# Patient Record
Sex: MALE | Race: WHITE | NOT HISPANIC OR LATINO | Employment: STUDENT | URBAN - METROPOLITAN AREA
[De-identification: names, ages, dates, MRNs, and addresses within clinical notes are randomized per-mention and may not be internally consistent; named-entity substitution may affect disease eponyms.]

---
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
## 2021-06-07 ENCOUNTER — OFFICE VISIT (OUTPATIENT)
Dept: PSYCHOLOGY | Facility: CLINIC | Age: 21
End: 2021-06-07
Payer: COMMERCIAL

## 2021-06-07 DIAGNOSIS — F41.1 GAD (GENERALIZED ANXIETY DISORDER): ICD-10-CM

## 2021-06-07 DIAGNOSIS — F90.0 ATTENTION DEFICIT HYPERACTIVITY DISORDER (ADHD), PREDOMINANTLY INATTENTIVE TYPE: Primary | ICD-10-CM

## 2021-06-07 DIAGNOSIS — F32.A DEPRESSION, UNSPECIFIED DEPRESSION TYPE: ICD-10-CM

## 2021-06-07 PROCEDURE — 90791 PSYCH DIAGNOSTIC EVALUATION: CPT | Mod: S$GLB,,, | Performed by: SOCIAL WORKER

## 2021-06-07 PROCEDURE — 90791 PR PSYCHIATRIC DIAGNOSTIC EVALUATION: ICD-10-PCS | Mod: S$GLB,,, | Performed by: SOCIAL WORKER

## 2021-06-07 PROCEDURE — 90785 PR INTERACTIVE COMPLEXITY: ICD-10-PCS | Mod: S$GLB,,, | Performed by: SOCIAL WORKER

## 2021-06-07 PROCEDURE — 90785 PSYTX COMPLEX INTERACTIVE: CPT | Mod: S$GLB,,, | Performed by: SOCIAL WORKER

## 2021-06-22 ENCOUNTER — PATIENT MESSAGE (OUTPATIENT)
Dept: PRIMARY CARE CLINIC | Facility: CLINIC | Age: 21
End: 2021-06-22

## 2021-06-23 ENCOUNTER — OFFICE VISIT (OUTPATIENT)
Dept: PRIMARY CARE CLINIC | Facility: CLINIC | Age: 21
End: 2021-06-23
Payer: COMMERCIAL

## 2021-06-23 VITALS
RESPIRATION RATE: 18 BRPM | HEIGHT: 75 IN | HEART RATE: 77 BPM | DIASTOLIC BLOOD PRESSURE: 86 MMHG | WEIGHT: 173.63 LBS | TEMPERATURE: 98 F | BODY MASS INDEX: 21.59 KG/M2 | OXYGEN SATURATION: 98 % | SYSTOLIC BLOOD PRESSURE: 120 MMHG

## 2021-06-23 DIAGNOSIS — Z11.4 ENCOUNTER FOR SCREENING FOR HIV: ICD-10-CM

## 2021-06-23 DIAGNOSIS — K62.5 BRIGHT RED BLOOD PER RECTUM: ICD-10-CM

## 2021-06-23 DIAGNOSIS — Z87.442 HISTORY OF NEPHROLITHIASIS: ICD-10-CM

## 2021-06-23 DIAGNOSIS — Z11.59 NEED FOR HEPATITIS C SCREENING TEST: ICD-10-CM

## 2021-06-23 DIAGNOSIS — Z87.898 HISTORY OF VOMITING: Primary | ICD-10-CM

## 2021-06-23 PROCEDURE — 99214 PR OFFICE/OUTPT VISIT, EST, LEVL IV, 30-39 MIN: ICD-10-PCS | Mod: S$GLB,,, | Performed by: FAMILY MEDICINE

## 2021-06-23 PROCEDURE — 99999 PR PBB SHADOW E&M-EST. PATIENT-LVL III: CPT | Mod: PBBFAC,,, | Performed by: FAMILY MEDICINE

## 2021-06-23 PROCEDURE — 99999 PR PBB SHADOW E&M-EST. PATIENT-LVL III: ICD-10-PCS | Mod: PBBFAC,,, | Performed by: FAMILY MEDICINE

## 2021-06-23 PROCEDURE — 99214 OFFICE O/P EST MOD 30 MIN: CPT | Mod: S$GLB,,, | Performed by: FAMILY MEDICINE

## 2021-06-23 RX ORDER — DEXTROAMPHETAMINE SACCHARATE, AMPHETAMINE ASPARTATE MONOHYDRATE, DEXTROAMPHETAMINE SULFATE AND AMPHETAMINE SULFATE 7.5; 7.5; 7.5; 7.5 MG/1; MG/1; MG/1; MG/1
30 CAPSULE, EXTENDED RELEASE ORAL EVERY MORNING
COMMUNITY
End: 2024-01-05 | Stop reason: ALTCHOICE

## 2021-12-03 ENCOUNTER — IMMUNIZATION (OUTPATIENT)
Dept: PRIMARY CARE CLINIC | Facility: CLINIC | Age: 21
End: 2021-12-03
Payer: COMMERCIAL

## 2021-12-03 DIAGNOSIS — Z23 NEED FOR VACCINATION: Primary | ICD-10-CM

## 2021-12-03 PROCEDURE — 0001A COVID-19, MRNA, LNP-S, PF, 30 MCG/0.3 ML DOSE VACCINE: CPT | Mod: PBBFAC | Performed by: EMERGENCY MEDICINE

## 2021-12-29 ENCOUNTER — IMMUNIZATION (OUTPATIENT)
Dept: INTERNAL MEDICINE | Facility: CLINIC | Age: 21
End: 2021-12-29
Payer: COMMERCIAL

## 2021-12-29 DIAGNOSIS — Z23 NEED FOR VACCINATION: Primary | ICD-10-CM

## 2021-12-29 PROCEDURE — 91300 COVID-19, MRNA, LNP-S, PF, 30 MCG/0.3 ML DOSE VACCINE: CPT | Mod: PBBFAC | Performed by: INTERNAL MEDICINE

## 2021-12-29 PROCEDURE — 0002A COVID-19, MRNA, LNP-S, PF, 30 MCG/0.3 ML DOSE VACCINE: CPT | Mod: PBBFAC | Performed by: INTERNAL MEDICINE

## 2023-09-20 ENCOUNTER — PATIENT MESSAGE (OUTPATIENT)
Dept: PRIMARY CARE CLINIC | Facility: CLINIC | Age: 23
End: 2023-09-20
Payer: COMMERCIAL

## 2023-09-20 DIAGNOSIS — F90.0 ATTENTION DEFICIT HYPERACTIVITY DISORDER (ADHD), PREDOMINANTLY INATTENTIVE TYPE: Primary | ICD-10-CM

## 2023-10-04 NOTE — TELEPHONE ENCOUNTER
Called pt regarding message. Informed pt of Psychiatry phone number. Pt stated he will call to schedule appt.

## 2023-10-18 ENCOUNTER — PATIENT MESSAGE (OUTPATIENT)
Dept: CARDIOLOGY | Facility: CLINIC | Age: 23
End: 2023-10-18
Payer: COMMERCIAL

## 2023-12-28 ENCOUNTER — CLINICAL SUPPORT (OUTPATIENT)
Dept: PSYCHIATRY | Facility: CLINIC | Age: 23
End: 2023-12-28
Payer: COMMERCIAL

## 2023-12-28 ENCOUNTER — PATIENT MESSAGE (OUTPATIENT)
Dept: PSYCHIATRY | Facility: CLINIC | Age: 23
End: 2023-12-28
Payer: COMMERCIAL

## 2023-12-28 ENCOUNTER — OFFICE VISIT (OUTPATIENT)
Dept: PSYCHIATRY | Facility: CLINIC | Age: 23
End: 2023-12-28
Payer: COMMERCIAL

## 2023-12-28 VITALS
WEIGHT: 203.13 LBS | HEIGHT: 75 IN | BODY MASS INDEX: 25.26 KG/M2 | DIASTOLIC BLOOD PRESSURE: 56 MMHG | HEART RATE: 67 BPM | SYSTOLIC BLOOD PRESSURE: 113 MMHG

## 2023-12-28 DIAGNOSIS — F90.0 ATTENTION DEFICIT HYPERACTIVITY DISORDER (ADHD), PREDOMINANTLY INATTENTIVE TYPE: Primary | ICD-10-CM

## 2023-12-28 DIAGNOSIS — F41.1 GAD (GENERALIZED ANXIETY DISORDER): Primary | ICD-10-CM

## 2023-12-28 DIAGNOSIS — F31.81 BIPOLAR 2 DISORDER: ICD-10-CM

## 2023-12-28 DIAGNOSIS — F90.0 ATTENTION DEFICIT HYPERACTIVITY DISORDER (ADHD), PREDOMINANTLY INATTENTIVE TYPE: ICD-10-CM

## 2023-12-28 LAB
AMPHET+METHAMPHET UR QL: NEGATIVE
BARBITURATES UR QL SCN>200 NG/ML: NEGATIVE
BENZODIAZ UR QL SCN>200 NG/ML: NEGATIVE
BZE UR QL SCN: NEGATIVE
CANNABINOIDS UR QL SCN: NEGATIVE
CREAT UR-MCNC: 418 MG/DL (ref 23–375)
ETHANOL UR-MCNC: <10 MG/DL
METHADONE UR QL SCN>300 NG/ML: NEGATIVE
OPIATES UR QL SCN: NEGATIVE
PCP UR QL SCN>25 NG/ML: NEGATIVE
TOXICOLOGY INFORMATION: ABNORMAL

## 2023-12-28 PROCEDURE — 3008F BODY MASS INDEX DOCD: CPT | Mod: CPTII,S$GLB,, | Performed by: NURSE PRACTITIONER

## 2023-12-28 PROCEDURE — 1160F RVW MEDS BY RX/DR IN RCRD: CPT | Mod: CPTII,S$GLB,, | Performed by: NURSE PRACTITIONER

## 2023-12-28 PROCEDURE — 3074F SYST BP LT 130 MM HG: CPT | Mod: CPTII,S$GLB,, | Performed by: NURSE PRACTITIONER

## 2023-12-28 PROCEDURE — 1159F MED LIST DOCD IN RCRD: CPT | Mod: CPTII,S$GLB,, | Performed by: NURSE PRACTITIONER

## 2023-12-28 PROCEDURE — 80307 DRUG TEST PRSMV CHEM ANLYZR: CPT | Performed by: NURSE PRACTITIONER

## 2023-12-28 PROCEDURE — 3078F DIAST BP <80 MM HG: CPT | Mod: CPTII,S$GLB,, | Performed by: NURSE PRACTITIONER

## 2023-12-28 PROCEDURE — 99999 PR PBB SHADOW E&M-EST. PATIENT-LVL III: CPT | Mod: PBBFAC,,, | Performed by: NURSE PRACTITIONER

## 2023-12-28 PROCEDURE — 99205 OFFICE O/P NEW HI 60 MIN: CPT | Mod: S$GLB,,, | Performed by: NURSE PRACTITIONER

## 2023-12-28 RX ORDER — CARIPRAZINE 1.5 MG/1
1.5 CAPSULE, GELATIN COATED ORAL DAILY
Qty: 30 CAPSULE | Refills: 1 | Status: SHIPPED | OUTPATIENT
Start: 2023-12-28 | End: 2024-02-08 | Stop reason: ALTCHOICE
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
## 2024-01-02 ENCOUNTER — TELEPHONE (OUTPATIENT)
Dept: PSYCHOLOGY | Facility: CLINIC | Age: 24
End: 2024-01-02
Payer: COMMERCIAL

## 2024-02-08 ENCOUNTER — OFFICE VISIT (OUTPATIENT)
Dept: PSYCHIATRY | Facility: CLINIC | Age: 24
End: 2024-02-08
Payer: COMMERCIAL

## 2024-02-08 VITALS
DIASTOLIC BLOOD PRESSURE: 84 MMHG | SYSTOLIC BLOOD PRESSURE: 124 MMHG | WEIGHT: 197.44 LBS | BODY MASS INDEX: 24.68 KG/M2 | HEART RATE: 59 BPM

## 2024-02-08 DIAGNOSIS — F90.0 ATTENTION DEFICIT HYPERACTIVITY DISORDER (ADHD), PREDOMINANTLY INATTENTIVE TYPE: Primary | ICD-10-CM

## 2024-02-08 DIAGNOSIS — F41.1 GAD (GENERALIZED ANXIETY DISORDER): ICD-10-CM

## 2024-02-08 DIAGNOSIS — F31.81 BIPOLAR 2 DISORDER: ICD-10-CM

## 2024-02-08 DIAGNOSIS — G47.00 INSOMNIA, UNSPECIFIED TYPE: ICD-10-CM

## 2024-02-08 PROCEDURE — 3008F BODY MASS INDEX DOCD: CPT | Mod: CPTII,S$GLB,, | Performed by: NURSE PRACTITIONER

## 2024-02-08 PROCEDURE — 99999 PR PBB SHADOW E&M-EST. PATIENT-LVL II: CPT | Mod: PBBFAC,,, | Performed by: NURSE PRACTITIONER

## 2024-02-08 PROCEDURE — 3079F DIAST BP 80-89 MM HG: CPT | Mod: CPTII,S$GLB,, | Performed by: NURSE PRACTITIONER

## 2024-02-08 PROCEDURE — 3074F SYST BP LT 130 MM HG: CPT | Mod: CPTII,S$GLB,, | Performed by: NURSE PRACTITIONER

## 2024-02-08 PROCEDURE — 99215 OFFICE O/P EST HI 40 MIN: CPT | Mod: S$GLB,,, | Performed by: NURSE PRACTITIONER

## 2024-02-08 RX ORDER — HYDROXYZINE PAMOATE 25 MG/1
25 CAPSULE ORAL NIGHTLY PRN
Qty: 60 CAPSULE | Refills: 1 | Status: SHIPPED | OUTPATIENT
Start: 2024-02-08 | End: 2024-03-18 | Stop reason: SDUPTHER

## 2024-02-08 RX ORDER — ARIPIPRAZOLE 5 MG/1
5 TABLET ORAL DAILY
Qty: 30 TABLET | Refills: 1 | Status: SHIPPED | OUTPATIENT
Start: 2024-02-08 | End: 2024-03-18 | Stop reason: SDUPTHER

## 2024-02-08 NOTE — PROGRESS NOTES
Outpatient Psychiatry Follow-Up Visit ([facility])    02/08/2024    Clinical Status of Patient:  Outpatient (Ambulatory)    Chief Complaint:  Giorgi Whitley is a 23 y.o. adult who presents today for follow-up of anxiety and Bipolar II, ADHD .  Met with patient.     Current Medications:   Vraylar 1.5 mg daily    Past Medication Trials:  Adderall 30 mg Xr - too strong  Amphetamine BID- unknown    Interval History and Content of Current Session:  Patient seen and chart reviewed. Last seen on 12/28/23    Patient was prescribed Vraylar at the previous visit for mood stabilization. He presents as frustrated because he has not been able to start this medication due to his insurance company not approving it and  not informing this provider. Reports he's been able to be fairly consistent with tracking his max until about 2-3 weeks ago. Reports continued mood cycling during this time, has had more lows than highs, and days where he does not want to get out of bed. He reports having anxiety about anticipating situations where he has to talk to to her people. Reports these thoughts can get him very worked up and anxious. He continues to day dream about fantasy things or games, he does this in many settings, work, home on the couch, while playing on his phone etc. Reports difficulty with focusing and paying attention, Yesterday he accidentally wore 2 left shoes to work. He often leaves for work and realizes he does not have his keys to start his car.     Denies SI/HI/AVH. Denies adverse effects from medication  Pt reports taking medications as prescribed and behaving appropriately during interview today.    Pt appears:  Appropriate attire    Mood:  Flat    Sleep:  Reports sleeping 4-6 hrs, takes hours to fall asleep.     Appetite:  Slightly diminished    Self Rates Depression: 4-5/10  Self Rated Anxiety:  8-9/10    AIMS:    0    Psychotherapy:  Target symptoms: depression, distractability, lack of focus, anxiety , mood  swings, mood disorder  Why chosen therapy is appropriate versus another modality: relevant to diagnosis  Outcome monitoring methods: self-report  Therapeutic intervention type: supportive psychotherapy  Topics discussed/themes: relationships difficulties, work stress, symptom recognition  The patient's response to the intervention is accepting, motivated. The patient's progress toward treatment goals is not progressing.   Duration of intervention: 20 minutes.    Review of Systems   PSYCHIATRIC: Pertinant items are noted in the narrative.        Past Medical, Family and Social History: The patient's past medical, family and social history have been reviewed and updated as appropriate within the electronic medical record - see encounter notes.    Compliance: unable to be    Side effects: None    Risk Parameters:  Patient reports no suicidal ideation  Patient reports no homicidal ideation  Patient reports no self-injurious behavior  Patient reports no violent behavior    Exam (detailed: at least 9 elements; comprehensive: all 15 elements)   Constitutional  Vitals:  Most recent vital signs, dated less than 90 days prior to this appointment, were reviewed.   Vitals:    02/08/24 1043   BP: 124/84   Pulse: (!) 59   Weight: 89.5 kg (197 lb 6.8 oz)        General:  unremarkable, age appropriate     Musculoskeletal  Muscle Strength/Tone:  no spasicity, no rigidity, no cogwheeling, no flaccidity, no paratonia, no dyskinesia, no dystonia, no tremor, no tic, no choreoathetosis, no atrophy   Gait & Station:  non-ataxic     Psychiatric  Speech:  no latency; no press   Mood & Affect:  steady  congruent and appropriate   Thought Process:  normal and logical   Associations:  intact   Thought Content:  normal, no suicidality, no homicidality, delusions, or paranoia   Insight:  intact, has awareness of illness   Judgement: behavior is adequate to circumstances, age appropriate   Orientation:  grossly intact, person, place, situation,  time/date, day of week   Memory: intact for content of interview, grossly intact, memory >3 objects at five mins   Language: grossly intact, able to name, able to repeat   Attention Span & Concentration:  able to focus   Fund of Knowledge:  intact and appropriate to age and level of education, familiar with aspects of current personal life, 4 of 4 recent presidents     Assessment and Diagnosis   Status/Progress: Based on the examination today, the patient's problem(s) is/are  not changed .  New problems have not been presented today.   Co-morbidities, Diagnostic uncertainty, and Lack of compliance are not complicating management of the primary condition.  There are no active rule-out diagnoses for this patient at this time.     General Impression: Giorgi Whitley, a 23 y.o. adult, presenting for follow up of ADHD, JOJO and Bipolar II disorder.  Presents 12/28/23- Patient has been unmediated. Start Vraylar 1.5 mg Daily   Presents 2/8/24- D/c Vraylar due to insurance, Stat Abilify 5 mg Daily      ICD-10-CM ICD-9-CM    1. Attention deficit hyperactivity disorder (ADHD), predominantly inattentive type  F90.0 314.00 ARIPiprazole (ABILIFY) 5 MG Tab      2. Bipolar 2 disorder  F31.81 296.89 ARIPiprazole (ABILIFY) 5 MG Tab      3. JOJO (generalized anxiety disorder)  F41.1 300.02       4. Insomnia, unspecified type  G47.00 780.52 hydrOXYzine pamoate (VISTARIL) 25 MG Cap          Intervention/Counseling/Treatment Plan   Medication Management: The risks and benefits of medication were discussed with the patient.  D/c Vraylar due to insurance  Stat Abilify 5 mg Daily  Discussed diagnosis, risk and benefits of proposed treatment above vs alternative treatment vs no treatment, and potential side effects of these treatments, and the inherent unpredictability of individual responses to these treatments. The patient expresses understanding and gives informed consent to pursue treatment at this time, believing that the potential  benefits outweigh the potential risks. Patient has no other questions. Risks/adverse effects at this time include but are not limited to: GI side effects, sexual dysfunction, activation vs sedation, triggering of suicidal ideation, and serotonin syndrome.   Patient voices understanding and agreement with this plan  Provided crisis numbers  Encouraged patient to keep future appointments  Instruct patient to call or message with questions  In the event of an emergency, including suicidal ideation, patient was advised to go to the emergency room      Return to Clinic: 1 month    Total time: 40 Minutes       This includes face to face time and non-face to face time preparing to see the patient (eg, review of tests), obtaining and/or reviewing separately obtained history, documenting clinical information in the electronic or other health record, independently interpreting results and communicating results to the patient/family/caregiver, or care coordinator.        Emmy Rodas DNP, PMHNP, FNP

## 2024-03-18 ENCOUNTER — OFFICE VISIT (OUTPATIENT)
Dept: PSYCHOLOGY | Facility: CLINIC | Age: 24
End: 2024-03-18
Payer: COMMERCIAL

## 2024-03-18 VITALS — HEART RATE: 55 BPM | SYSTOLIC BLOOD PRESSURE: 123 MMHG | DIASTOLIC BLOOD PRESSURE: 80 MMHG

## 2024-03-18 DIAGNOSIS — F90.0 ATTENTION DEFICIT HYPERACTIVITY DISORDER (ADHD), PREDOMINANTLY INATTENTIVE TYPE: Primary | ICD-10-CM

## 2024-03-18 DIAGNOSIS — G47.00 INSOMNIA, UNSPECIFIED TYPE: ICD-10-CM

## 2024-03-18 DIAGNOSIS — F31.81 BIPOLAR 2 DISORDER: ICD-10-CM

## 2024-03-18 PROCEDURE — 3079F DIAST BP 80-89 MM HG: CPT | Mod: CPTII,S$GLB,, | Performed by: NURSE PRACTITIONER

## 2024-03-18 PROCEDURE — 99214 OFFICE O/P EST MOD 30 MIN: CPT | Mod: S$GLB,,, | Performed by: NURSE PRACTITIONER

## 2024-03-18 PROCEDURE — 99999 PR PBB SHADOW E&M-EST. PATIENT-LVL II: CPT | Mod: PBBFAC,,, | Performed by: NURSE PRACTITIONER

## 2024-03-18 PROCEDURE — 3074F SYST BP LT 130 MM HG: CPT | Mod: CPTII,S$GLB,, | Performed by: NURSE PRACTITIONER

## 2024-03-18 RX ORDER — HYDROXYZINE PAMOATE 25 MG/1
25 CAPSULE ORAL NIGHTLY PRN
Qty: 180 CAPSULE | Refills: 0 | Status: SHIPPED | OUTPATIENT
Start: 2024-03-18 | End: 2024-04-29 | Stop reason: SDUPTHER

## 2024-03-18 RX ORDER — ATOMOXETINE 40 MG/1
40 CAPSULE ORAL DAILY
Qty: 30 CAPSULE | Refills: 1 | Status: SHIPPED | OUTPATIENT
Start: 2024-03-18 | End: 2024-04-29 | Stop reason: ALTCHOICE

## 2024-03-18 RX ORDER — ARIPIPRAZOLE 5 MG/1
5 TABLET ORAL DAILY
Qty: 90 TABLET | Refills: 0 | Status: SHIPPED | OUTPATIENT
Start: 2024-03-18 | End: 2024-04-29 | Stop reason: SDUPTHER

## 2024-03-18 NOTE — PROGRESS NOTES
Outpatient Psychiatry Follow-Up Visit ([facility])    03/18/2024    Clinical Status of Patient:  Outpatient (Ambulatory)    Chief Complaint:  Giorgi Whitley is a 23 y.o. adult who presents today for follow-up of anxiety and Bipolar II, ADHD .  Met with patient.     Current Medications:   Abilify 5 mg daily  Hydroxyzine 25 mg Daily PRN    Past Medication Trials:  Adderall 30 mg Xr - too strong  Amphetamine BID- unknown    Interval History and Content of Current Session:  Patient seen and chart reviewed. Last seen on 2/8/23    Abilify 5 mg was started at the last visit due to insurance not covering Vraylar.  Reports taking for 5.5 weeks. .Reports his mood has been better and not over thinking situations more. Feels he has 1 depressive episode 1 week after starting the medication, denies any since then, denies any hypomanic episodes, report being much more talkative to people and feels more talkative ans social than before. Reports even talking to strangers which is nice.    Reports that he stopped drinking caffeine 1.5 months ago, and not drinking energy drinks either. He reports taking hydroxyzine 25 mg at 12 pm daily.     Patient reports that he continues to forget think and has difficulty concentrating at work and home, reports frequently wearing the wrong shoes to work, or having the wrong left and right shoe on the wrong foot.     Denies SI/HI/AVH. Denies adverse effects from medication  Pt reports taking medications as prescribed and behaving appropriately during interview today.    Pt appears:  Appropriate attire    Mood:  Flat    Sleep:  Reports sleeping 6-8 hrs, at times he wakes up, can fall back   easily.    Appetite:  Slightly diminished    Self Rates Depression: 0-1/10  Self Rated Anxiety:  3-4/10    AIMS:    0    Psychotherapy:  Target symptoms: depression, distractability, lack of focus, anxiety , mood swings, mood disorder  Why chosen therapy is appropriate versus another modality: relevant to  diagnosis  Outcome monitoring methods: self-report  Therapeutic intervention type: supportive psychotherapy  Topics discussed/themes: relationships difficulties, work stress, symptom recognition  The patient's response to the intervention is accepting, motivated. The patient's progress toward treatment goals is not progressing.   Duration of intervention: 20 minutes.    Review of Systems   PSYCHIATRIC: Pertinant items are noted in the narrative.        Past Medical, Family and Social History: The patient's past medical, family and social history have been reviewed and updated as appropriate within the electronic medical record - see encounter notes.    Compliance: unable to be    Side effects: None    Risk Parameters:  Patient reports no suicidal ideation  Patient reports no homicidal ideation  Patient reports no self-injurious behavior  Patient reports no violent behavior    Exam (detailed: at least 9 elements; comprehensive: all 15 elements)   Constitutional  Vitals:  Most recent vital signs, dated less than 90 days prior to this appointment, were reviewed.   Vitals:    03/18/24 1139   BP: 123/80   Pulse: (!) 55        General:  unremarkable, age appropriate     Musculoskeletal  Muscle Strength/Tone:  no spasicity, no rigidity, no cogwheeling, no flaccidity, no paratonia, no dyskinesia, no dystonia, no tremor, no tic, no choreoathetosis, no atrophy   Gait & Station:  non-ataxic     Psychiatric  Speech:  no latency; no press   Mood & Affect:  steady  congruent and appropriate   Thought Process:  normal and logical   Associations:  intact   Thought Content:  normal, no suicidality, no homicidality, delusions, or paranoia   Insight:  intact, has awareness of illness   Judgement: behavior is adequate to circumstances, age appropriate   Orientation:  grossly intact, person, place, situation, time/date, day of week   Memory: intact for content of interview, grossly intact, memory >3 objects at five mins   Language:  grossly intact, able to name, able to repeat   Attention Span & Concentration:  able to focus   Fund of Knowledge:  intact and appropriate to age and level of education, familiar with aspects of current personal life, 4 of 4 recent presidents     Assessment and Diagnosis   Status/Progress: Based on the examination today, the patient's problem(s) is/are  not changed .  New problems have not been presented today.   Co-morbidities, Diagnostic uncertainty, and Lack of compliance are not complicating management of the primary condition.  There are no active rule-out diagnoses for this patient at this time.     General Impression: Giorgi Whitley, a 23 y.o. adult, presenting for follow up of ADHD, JOJO and Bipolar II disorder.  Presents 12/28/23- Patient has been unmediated. Start Vraylar 1.5 mg Daily   Presents 2/8/24- D/c Vraylar due to insurance, Stat Abilify 5 mg Daily  Presents 3/18/24- Mood has stabilized, Start Strattera 80 mg Daily       ICD-10-CM ICD-9-CM    1. Attention deficit hyperactivity disorder (ADHD), predominantly inattentive type  F90.0 314.00 atomoxetine (STRATTERA) 40 MG capsule      ARIPiprazole (ABILIFY) 5 MG Tab      2. Bipolar 2 disorder  F31.81 296.89 ARIPiprazole (ABILIFY) 5 MG Tab      3. Insomnia, unspecified type  G47.00 780.52 hydrOXYzine pamoate (VISTARIL) 25 MG Cap            Intervention/Counseling/Treatment Plan   Medication Management: The risks and benefits of medication were discussed with the patient.  Continue Abilify 5 mg Daily  Strattera 80 mg Daily for ADHD  Continue Hydroxyzine 25 mg Daily   Discussed diagnosis, risk and benefits of proposed treatment above vs alternative treatment vs no treatment, and potential side effects of these treatments, and the inherent unpredictability of individual responses to these treatments. The patient expresses understanding and gives informed consent to pursue treatment at this time, believing that the potential benefits outweigh the  potential risks. Patient has no other questions. Risks/adverse effects at this time include but are not limited to: GI side effects, sexual dysfunction, activation vs sedation, triggering of suicidal ideation, and serotonin syndrome.   Patient voices understanding and agreement with this plan  Provided crisis numbers  Encouraged patient to keep future appointments  Instruct patient to call or message with questions  In the event of an emergency, including suicidal ideation, patient was advised to go to the emergency room      Return to Clinic: 1 month    Total time: 30 Minutes       This includes face to face time and non-face to face time preparing to see the patient (eg, review of tests), obtaining and/or reviewing separately obtained history, documenting clinical information in the electronic or other health record, independently interpreting results and communicating results to the patient/family/caregiver, or care coordinator.        Emmy Rodas DNP, PMHNP, FNP

## 2024-04-02 ENCOUNTER — OFFICE VISIT (OUTPATIENT)
Dept: PSYCHOLOGY | Facility: CLINIC | Age: 24
End: 2024-04-02
Payer: COMMERCIAL

## 2024-04-02 DIAGNOSIS — F90.0 ATTENTION DEFICIT HYPERACTIVITY DISORDER (ADHD), PREDOMINANTLY INATTENTIVE TYPE: Primary | ICD-10-CM

## 2024-04-02 PROCEDURE — 99214 OFFICE O/P EST MOD 30 MIN: CPT | Mod: 95,,, | Performed by: NURSE PRACTITIONER

## 2024-04-02 RX ORDER — DEXTROAMPHETAMINE SACCHARATE, AMPHETAMINE ASPARTATE MONOHYDRATE, DEXTROAMPHETAMINE SULFATE AND AMPHETAMINE SULFATE 2.5; 2.5; 2.5; 2.5 MG/1; MG/1; MG/1; MG/1
10 CAPSULE, EXTENDED RELEASE ORAL EVERY MORNING
Qty: 30 CAPSULE | Refills: 0 | Status: SHIPPED | OUTPATIENT
Start: 2024-04-02 | End: 2024-04-29

## 2024-04-02 NOTE — PROGRESS NOTES
The patient location is: [place], [place]  The chief complaint leading to consultation is: ADHD, Bipolar    Visit type: audiovisual    Face to Face time with patient: 20  30 minutes of total time spent on the encounter, which includes face to face time and non-face to face time preparing to see the patient (eg, review of tests), Obtaining and/or reviewing separately obtained history, Documenting clinical information in the electronic or other health record, Independently interpreting results (not separately reported) and communicating results to the patient/family/caregiver, or Care coordination (not separately reported).         Each patient to whom he or she provides medical services by telemedicine is:  (1) informed of the relationship between the physician and patient and the respective role of any other health care provider with respect to management of the patient; and (2) notified that he or she may decline to receive medical services by telemedicine and may withdraw from such care at any time.    Notes:     Outpatient Psychiatry Follow-Up Visit ([facility])    04/02/2024    Clinical Status of Patient:  Outpatient (Ambulatory)    Chief Complaint:  Giorgi Whitley is a 23 y.o. adult who presents today for follow-up of anxiety and Bipolar II, ADHD .  Met with patient.     Current Medications:   Abilify 5 mg daily  Hydroxyzine 25 mg Daily PRN    Past Medication Trials:  Adderall 30 mg Xr - too strong  Amphetamine BID- unknown    Interval History and Content of Current Session:  Patient seen and chart reviewed. Last seen on 3/18/24    Patient reports feeling antsy and aggravated with the medication after 2 weeks of being on it.   He scheduled this appointment earlier than he should have due to this. Reports some moments of irritability this oast 2 weeks but no mood cycling.   He denies hx of cardiac issues, palpitations, chest pain, or blood pressure issues. Denies congenital defects in childhood or  surgeries.    Denies SI/HI/AVH. Denies adverse effects from medication  Pt reports taking medications as prescribed and behaving appropriately during interview today.    Pt appears:  Appropriate attire    Mood:  Content    Sleep:  Reports sleeping 6-8 hrs, at times he wakes up, can fall back   easily.    Appetite:  Slightly diminished    Self Rates Depression: 0-1/10  Self Rated Anxiety:  3-4/10    AIMS:    0    Psychotherapy:  Target symptoms: depression, distractability, lack of focus, anxiety , mood swings, mood disorder  Why chosen therapy is appropriate versus another modality: relevant to diagnosis  Outcome monitoring methods: self-report  Therapeutic intervention type: supportive psychotherapy  Topics discussed/themes: relationships difficulties, work stress, symptom recognition  The patient's response to the intervention is accepting, motivated. The patient's progress toward treatment goals is not progressing.   Duration of intervention: 20 minutes.    Review of Systems   PSYCHIATRIC: Pertinant items are noted in the narrative.        Past Medical, Family and Social History: The patient's past medical, family and social history have been reviewed and updated as appropriate within the electronic medical record - see encounter notes.    Compliance: unable to be    Side effects: None    Risk Parameters:  Patient reports no suicidal ideation  Patient reports no homicidal ideation  Patient reports no self-injurious behavior  Patient reports no violent behavior    Exam (detailed: at least 9 elements; comprehensive: all 15 elements)   Constitutional  Vitals:  Most recent vital signs, dated less than 90 days prior to this appointment, were reviewed.   There were no vitals filed for this visit.       General:  unremarkable, age appropriate     Musculoskeletal  Muscle Strength/Tone:  no spasicity, no rigidity, no cogwheeling, no flaccidity, no paratonia, no dyskinesia, no dystonia, no tremor, no tic, no  choreoathetosis, no atrophy   Gait & Station:  non-ataxic     Psychiatric  Speech:  no latency; no press   Mood & Affect:  steady  congruent and appropriate   Thought Process:  normal and logical   Associations:  intact   Thought Content:  normal, no suicidality, no homicidality, delusions, or paranoia   Insight:  intact, has awareness of illness   Judgement: behavior is adequate to circumstances, age appropriate   Orientation:  grossly intact, person, place, situation, time/date, day of week   Memory: intact for content of interview, grossly intact, memory >3 objects at five mins   Language: grossly intact, able to name, able to repeat   Attention Span & Concentration:  able to focus   Fund of Knowledge:  intact and appropriate to age and level of education, familiar with aspects of current personal life, 4 of 4 recent presidents     Assessment and Diagnosis   Status/Progress: Based on the examination today, the patient's problem(s) is/are  not changed .  New problems have not been presented today.   Co-morbidities, Diagnostic uncertainty, and Lack of compliance are not complicating management of the primary condition.  There are no active rule-out diagnoses for this patient at this time.     General Impression: Giorgi Whitley, a 23 y.o. adult, presenting for follow up of ADHD, JOJO and Bipolar II disorder.  Presents 12/28/23- Patient has been unmediated. Start Vraylar 1.5 mg Daily   Presents 2/8/24- D/c Vraylar due to insurance, Stat Abilify 5 mg Daily  Presents 3/18/24- Mood has stabilized, Start Strattera 80 mg Daily   Presents 4/2/24- Mood is stable, d/c Strattera, start Adderall 10 mg XR.       ICD-10-CM ICD-9-CM    1. Attention deficit hyperactivity disorder (ADHD), predominantly inattentive type  F90.0 314.00 dextroamphetamine-amphetamine (ADDERALL XR) 10 MG 24 hr capsule          Intervention/Counseling/Treatment Plan   Medication Management: The risks and benefits of medication were discussed with  the patient.  Continue Abilify 5 mg Daily  D/c Strattera   Start Adderall 10 mg XR Daily for ADHD  Continue Hydroxyzine 25 mg Daily   Discussed diagnosis, risk and benefits of proposed treatment above vs alternative treatment vs no treatment, and potential side effects of these treatments, and the inherent unpredictability of individual responses to these treatments. The patient expresses understanding and gives informed consent to pursue treatment at this time, believing that the potential benefits outweigh the potential risks. Patient has no other questions. Risks/adverse effects at this time include but are not limited to: GI side effects, sexual dysfunction, activation vs sedation, triggering of suicidal ideation, and serotonin syndrome.   Patient voices understanding and agreement with this plan  Provided crisis numbers  Encouraged patient to keep future appointments  Instruct patient to call or message with questions  In the event of an emergency, including suicidal ideation, patient was advised to go to the emergency room      Return to Clinic: 1 month    Total time: 30 Minutes       This includes face to face time and non-face to face time preparing to see the patient (eg, review of tests), obtaining and/or reviewing separately obtained history, documenting clinical information in the electronic or other health record, independently interpreting results and communicating results to the patient/family/caregiver, or care coordinator.        Emmy Rodas DNP, PMHNP, FNP

## 2024-04-29 ENCOUNTER — OFFICE VISIT (OUTPATIENT)
Dept: PSYCHOLOGY | Facility: CLINIC | Age: 24
End: 2024-04-29
Payer: COMMERCIAL

## 2024-04-29 VITALS
OXYGEN SATURATION: 100 % | DIASTOLIC BLOOD PRESSURE: 71 MMHG | SYSTOLIC BLOOD PRESSURE: 115 MMHG | TEMPERATURE: 98 F | HEART RATE: 49 BPM

## 2024-04-29 DIAGNOSIS — F90.0 ATTENTION DEFICIT HYPERACTIVITY DISORDER (ADHD), PREDOMINANTLY INATTENTIVE TYPE: Primary | ICD-10-CM

## 2024-04-29 DIAGNOSIS — G47.00 INSOMNIA, UNSPECIFIED TYPE: ICD-10-CM

## 2024-04-29 DIAGNOSIS — F31.81 BIPOLAR 2 DISORDER: ICD-10-CM

## 2024-04-29 PROCEDURE — 1159F MED LIST DOCD IN RCRD: CPT | Mod: CPTII,S$GLB,, | Performed by: NURSE PRACTITIONER

## 2024-04-29 PROCEDURE — 3078F DIAST BP <80 MM HG: CPT | Mod: CPTII,S$GLB,, | Performed by: NURSE PRACTITIONER

## 2024-04-29 PROCEDURE — 3074F SYST BP LT 130 MM HG: CPT | Mod: CPTII,S$GLB,, | Performed by: NURSE PRACTITIONER

## 2024-04-29 PROCEDURE — 99999 PR PBB SHADOW E&M-EST. PATIENT-LVL II: CPT | Mod: PBBFAC,,, | Performed by: NURSE PRACTITIONER

## 2024-04-29 PROCEDURE — 99214 OFFICE O/P EST MOD 30 MIN: CPT | Mod: S$GLB,,, | Performed by: NURSE PRACTITIONER

## 2024-04-29 RX ORDER — ARIPIPRAZOLE 5 MG/1
5 TABLET ORAL DAILY
Qty: 90 TABLET | Refills: 0 | Status: SHIPPED | OUTPATIENT
Start: 2024-04-29 | End: 2024-07-28

## 2024-04-29 RX ORDER — DEXTROAMPHETAMINE SACCHARATE, AMPHETAMINE ASPARTATE MONOHYDRATE, DEXTROAMPHETAMINE SULFATE AND AMPHETAMINE SULFATE 5; 5; 5; 5 MG/1; MG/1; MG/1; MG/1
20 CAPSULE, EXTENDED RELEASE ORAL EVERY MORNING
Qty: 30 CAPSULE | Refills: 0 | Status: SHIPPED | OUTPATIENT
Start: 2024-05-02 | End: 2024-06-01

## 2024-04-29 RX ORDER — HYDROXYZINE PAMOATE 25 MG/1
25 CAPSULE ORAL NIGHTLY PRN
Qty: 90 CAPSULE | Refills: 0 | Status: SHIPPED | OUTPATIENT
Start: 2024-04-29 | End: 2024-07-28

## 2024-04-29 NOTE — PROGRESS NOTES
Outpatient Psychiatry Follow-Up Visit ([facility])    04/29/2024    Clinical Status of Patient:  Outpatient (Ambulatory)    Chief Complaint:  Giorgi Whitley is a 23 y.o. adult who presents today for follow-up of anxiety and Bipolar II, ADHD .  Met with patient.     Current Medications:   Abilify 5 mg daily  Hydroxyzine 25 mg Daily PRN    Past Medication Trials:  Adderall 30 mg Xr - too strong  Amphetamine BID- unknown    Interval History and Content of Current Session:  Patient seen and chart reviewed. Last seen on 4/02/24    Changes at last visit:  Continue Abilify 5 mg Daily  D/c Strattera   Start Adderall 10 mg XR Daily for ADHD  Continue Hydroxyzine 25 mg Daily     Reports checking out less than her was before, medication lasts between 8 or so hours longer if he drinks coffee. Reports that his mood has remained stable at this time. He reports being able to hangout with friends and family more and this makes them and himself. Reports 1-2 episodes of feeling sad or down for 1-2 min at a time.     Denies SI/HI/AVH. Denies adverse effects from medication  Pt reports taking medications as prescribed and behaving appropriately during interview today.    Pt appears:  Appropriate attire    Mood:  Content    Sleep:  Reports sleeping 6-8 hrs, at times he wakes up, can fall back   easily.    Appetite:  Slightly diminished    Self Rates Depression: 0-1/10  Self Rated Anxiety:  3-4/10    AIMS:    0    Psychotherapy:  Target symptoms: depression, distractability, lack of focus, anxiety , mood swings, mood disorder  Why chosen therapy is appropriate versus another modality: relevant to diagnosis  Outcome monitoring methods: self-report  Therapeutic intervention type: supportive psychotherapy  Topics discussed/themes: relationships difficulties, work stress, symptom recognition  The patient's response to the intervention is accepting, motivated. The patient's progress toward treatment goals is not progressing.   Duration  of intervention: 20 minutes.    Review of Systems   PSYCHIATRIC: Pertinant items are noted in the narrative.        Past Medical, Family and Social History: The patient's past medical, family and social history have been reviewed and updated as appropriate within the electronic medical record - see encounter notes.    Compliance: unable to be    Side effects: None    Risk Parameters:  Patient reports no suicidal ideation  Patient reports no homicidal ideation  Patient reports no self-injurious behavior  Patient reports no violent behavior    Exam (detailed: at least 9 elements; comprehensive: all 15 elements)   Constitutional  Vitals:  Most recent vital signs, dated less than 90 days prior to this appointment, were reviewed.   Vitals:    04/29/24 1129   BP: 115/71   Pulse: (!) 49   Temp: 97.6 °F (36.4 °C)   SpO2: 100%          General:  unremarkable, age appropriate     Musculoskeletal  Muscle Strength/Tone:  no spasicity, no rigidity, no cogwheeling, no flaccidity, no paratonia, no dyskinesia, no dystonia, no tremor, no tic, no choreoathetosis, no atrophy   Gait & Station:  non-ataxic     Psychiatric  Speech:  no latency; no press   Mood & Affect:  steady  congruent and appropriate   Thought Process:  normal and logical   Associations:  intact   Thought Content:  normal, no suicidality, no homicidality, delusions, or paranoia   Insight:  intact, has awareness of illness   Judgement: behavior is adequate to circumstances, age appropriate   Orientation:  grossly intact, person, place, situation, time/date, day of week   Memory: intact for content of interview, grossly intact, memory >3 objects at five mins   Language: grossly intact, able to name, able to repeat   Attention Span & Concentration:  able to focus   Fund of Knowledge:  intact and appropriate to age and level of education, familiar with aspects of current personal life, 4 of 4 recent presidents     Assessment and Diagnosis   Status/Progress: Based on the  examination today, the patient's problem(s) is/are  not changed .  New problems have not been presented today.   Co-morbidities, Diagnostic uncertainty, and Lack of compliance are not complicating management of the primary condition.  There are no active rule-out diagnoses for this patient at this time.     General Impression: Giorgi Whitley, a 23 y.o. adult, presenting for follow up of ADHD, JOJO and Bipolar II disorder.  Presents 12/28/23- Patient has been unmediated. Start Vraylar 1.5 mg Daily   Presents 2/8/24- D/c Vraylar due to insurance, Stat Abilify 5 mg Daily  Presents 3/18/24- Mood has stabilized, Start Strattera 80 mg Daily   Presents 4/2/24- Mood is stable, d/c Strattera, start Adderall 10 mg XR  Presents 4/29/24- Increase Adderall to 20 mg Daily      ICD-10-CM ICD-9-CM    1. Attention deficit hyperactivity disorder (ADHD), predominantly inattentive type  F90.0 314.00 dextroamphetamine-amphetamine (ADDERALL XR) 20 MG 24 hr capsule      ARIPiprazole (ABILIFY) 5 MG Tab      2. Bipolar 2 disorder  F31.81 296.89 ARIPiprazole (ABILIFY) 5 MG Tab      3. Insomnia, unspecified type  G47.00 780.52 hydrOXYzine pamoate (VISTARIL) 25 MG Cap          Intervention/Counseling/Treatment Plan   Medication Management: The risks and benefits of medication were discussed with the patient.  Continue Abilify 5 mg Daily  Increase Adderall 20 mg XR Daily for ADHD  Checked [place]  and no irregularities were noted.  Last refill picked up on 4/2/24  Provided with 1 refills, starting on 5/2/24  Continue Hydroxyzine 25 mg Daily   Discussed diagnosis, risk and benefits of proposed treatment above vs alternative treatment vs no treatment, and potential side effects of these treatments, and the inherent unpredictability of individual responses to these treatments. The patient expresses understanding and gives informed consent to pursue treatment at this time, believing that the potential benefits outweigh the potential risks.  Patient has no other questions. Risks/adverse effects at this time include but are not limited to: GI side effects, sexual dysfunction, activation vs sedation, triggering of suicidal ideation, and serotonin syndrome.   Patient voices understanding and agreement with this plan  Provided crisis numbers  Encouraged patient to keep future appointments  Instruct patient to call or message with questions  In the event of an emergency, including suicidal ideation, patient was advised to go to the emergency room      Return to Clinic: 1 month    Total time: 30 Minutes       This includes face to face time and non-face to face time preparing to see the patient (eg, review of tests), obtaining and/or reviewing separately obtained history, documenting clinical information in the electronic or other health record, independently interpreting results and communicating results to the patient/family/caregiver, or care coordinator.        Emmy Rodas DNP, PMABRAHAMP, FNP

## 2024-07-03 ENCOUNTER — OFFICE VISIT (OUTPATIENT)
Dept: PSYCHOLOGY | Facility: CLINIC | Age: 24
End: 2024-07-03
Payer: COMMERCIAL

## 2024-07-03 DIAGNOSIS — F31.81 BIPOLAR 2 DISORDER: ICD-10-CM

## 2024-07-03 DIAGNOSIS — F90.0 ATTENTION DEFICIT HYPERACTIVITY DISORDER (ADHD), PREDOMINANTLY INATTENTIVE TYPE: ICD-10-CM

## 2024-07-03 DIAGNOSIS — G47.00 INSOMNIA, UNSPECIFIED TYPE: ICD-10-CM

## 2024-07-03 PROCEDURE — 99214 OFFICE O/P EST MOD 30 MIN: CPT | Mod: 95,,, | Performed by: NURSE PRACTITIONER

## 2024-07-03 RX ORDER — ARIPIPRAZOLE 5 MG/1
5 TABLET ORAL DAILY
Qty: 90 TABLET | Refills: 0 | Status: SHIPPED | OUTPATIENT
Start: 2024-07-03 | End: 2024-10-01

## 2024-07-03 RX ORDER — DEXTROAMPHETAMINE SACCHARATE, AMPHETAMINE ASPARTATE MONOHYDRATE, DEXTROAMPHETAMINE SULFATE AND AMPHETAMINE SULFATE 5; 5; 5; 5 MG/1; MG/1; MG/1; MG/1
20 CAPSULE, EXTENDED RELEASE ORAL EVERY MORNING
Qty: 30 CAPSULE | Refills: 0 | Status: SHIPPED | OUTPATIENT
Start: 2024-09-03 | End: 2024-10-03

## 2024-07-03 RX ORDER — DEXTROAMPHETAMINE SACCHARATE, AMPHETAMINE ASPARTATE MONOHYDRATE, DEXTROAMPHETAMINE SULFATE AND AMPHETAMINE SULFATE 5; 5; 5; 5 MG/1; MG/1; MG/1; MG/1
20 CAPSULE, EXTENDED RELEASE ORAL EVERY MORNING
Qty: 30 CAPSULE | Refills: 0 | Status: SHIPPED | OUTPATIENT
Start: 2024-07-03 | End: 2024-08-02

## 2024-07-03 RX ORDER — DEXTROAMPHETAMINE SACCHARATE, AMPHETAMINE ASPARTATE MONOHYDRATE, DEXTROAMPHETAMINE SULFATE AND AMPHETAMINE SULFATE 5; 5; 5; 5 MG/1; MG/1; MG/1; MG/1
20 CAPSULE, EXTENDED RELEASE ORAL EVERY MORNING
Qty: 30 CAPSULE | Refills: 0 | Status: SHIPPED | OUTPATIENT
Start: 2024-08-03 | End: 2024-09-02

## 2024-07-03 NOTE — PROGRESS NOTES
The patient location is: [facility]   The chief complaint leading to consultation is: ADHD, Bipolar II, insomnia    Visit type: audiovisual    Face to Face time with patient: 20  30 minutes of total time spent on the encounter, which includes face to face time and non-face to face time preparing to see the patient (eg, review of tests), Obtaining and/or reviewing separately obtained history, Documenting clinical information in the electronic or other health record, Independently interpreting results (not separately reported) and communicating results to the patient/family/caregiver, or Care coordination (not separately reported).     Each patient to whom he or she provides medical services by telemedicine is:  (1) informed of the relationship between the physician and patient and the respective role of any other health care provider with respect to management of the patient; and (2) notified that he or she may decline to receive medical services by telemedicine and may withdraw from such care at any time.    Notes:     Outpatient Psychiatry Follow-Up Visit ([facility])    07/03/2024    Clinical Status of Patient:  Outpatient (Ambulatory)    Chief Complaint:  Giorgi Whitley is a 24 y.o. adult who presents today for follow-up of anxiety and Bipolar II, ADHD .  Met with patient.     Current Medications:   Abilify 5 mg daily  Hydroxyzine 25 mg Daily PRN  Adderall 20 mg XR    Past Medication Trials:  Adderall 30 mg Xr - too strong  Amphetamine BID- unknown    Interval History and Content of Current Session:  Patient seen and chart reviewed. Last seen on 4/29/24    Changes at last visit:  Continue Abilify 5 mg Daily  Increase Adderall 20 mg XR Daily for ADHD  Continue Hydroxyzine 25 mg Daily     Reports moving 3 days ago to a new home with the same roommates. 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 Reports not having added anxiety with symptoms of hypomania.  Continues to take hydroxyzine PRN. Moods have been very stable.    Denies SI/HI/AVH. Denies adverse effects from medication  Pt reports taking medications as prescribed and behaving appropriately during interview today.    Pt appears:  Appropriate attire    Mood:  Content    Sleep:  Reports sleeping 7-8 hrs, at times he wakes up, can fall back   easily.    Appetite:  Increased, denies weight gain.     Self Rates Depression: 0-1/10  Self Rated Anxiety:  3-4/10    AIMS:    0    Psychotherapy:  Target symptoms: depression, distractability, lack of focus, anxiety , mood swings, mood disorder  Why chosen therapy is appropriate versus another modality: relevant to diagnosis  Outcome monitoring methods: self-report  Therapeutic intervention type: supportive psychotherapy  Topics discussed/themes: relationships difficulties, work stress, symptom recognition  The patient's response to the intervention is accepting, motivated. The patient's progress toward treatment goals is not progressing.   Duration of intervention: 20 minutes.    Review of Systems   PSYCHIATRIC: Pertinant items are noted in the narrative.        Past Medical, Family and Social History: The patient's past medical, family and social history have been reviewed and updated as appropriate within the electronic medical record - see encounter notes.    Compliance: unable to be    Side effects: None    Risk Parameters:  Patient reports no suicidal ideation  Patient reports no homicidal ideation  Patient reports no self-injurious behavior  Patient reports no violent behavior    Exam (detailed: at least 9 elements; comprehensive: all 15 elements)   Constitutional  Vitals:  Most recent vital signs, dated less than 90 days prior to this appointment, were reviewed.   There were no vitals filed for this visit.         General:  unremarkable, age appropriate     Musculoskeletal  Muscle Strength/Tone:  no spasicity, no rigidity, no cogwheeling, no flaccidity, no  paratonia, no dyskinesia, no dystonia, no tremor, no tic, no choreoathetosis, no atrophy   Gait & Station:  non-ataxic     Psychiatric  Speech:  no latency; no press   Mood & Affect:  steady  congruent and appropriate   Thought Process:  normal and logical   Associations:  intact   Thought Content:  normal, no suicidality, no homicidality, delusions, or paranoia   Insight:  intact, has awareness of illness   Judgement: behavior is adequate to circumstances, age appropriate   Orientation:  grossly intact, person, place, situation, time/date, day of week   Memory: intact for content of interview, grossly intact, memory >3 objects at five mins   Language: grossly intact, able to name, able to repeat   Attention Span & Concentration:  able to focus   Fund of Knowledge:  intact and appropriate to age and level of education, familiar with aspects of current personal life, 4 of 4 recent presidents     Assessment and Diagnosis   Status/Progress: Based on the examination today, the patient's problem(s) is/are  not changed .  New problems have not been presented today.   Co-morbidities, Diagnostic uncertainty, and Lack of compliance are not complicating management of the primary condition.  There are no active rule-out diagnoses for this patient at this time.     General Impression: Giorgi Whitley, a 24 y.o. adult, presenting for follow up of ADHD, JOJO and Bipolar II disorder.  Presents 12/28/23- Patient has been unmediated. Start Vraylar 1.5 mg Daily   Presents 2/8/24- D/c Vraylar due to insurance, Stat Abilify 5 mg Daily  Presents 3/18/24- Mood has stabilized, Start Strattera 80 mg Daily   Presents 4/2/24- Mood is stable, d/c Strattera, start Adderall 10 mg XR  Presents 4/29/24- Increase Adderall to 20 mg Daily  Presents 7/3/24- stable cont meds      ICD-10-CM ICD-9-CM    1. Attention deficit hyperactivity disorder (ADHD), predominantly inattentive type  F90.0 314.00 ARIPiprazole (ABILIFY) 5 MG Tab       dextroamphetamine-amphetamine (ADDERALL XR) 20 MG 24 hr capsule      dextroamphetamine-amphetamine (ADDERALL XR) 20 MG 24 hr capsule      dextroamphetamine-amphetamine (ADDERALL XR) 20 MG 24 hr capsule      2. Bipolar 2 disorder  F31.81 296.89 ARIPiprazole (ABILIFY) 5 MG Tab      3. Insomnia, unspecified type  G47.00 780.52           Intervention/Counseling/Treatment Plan   Medication Management: The risks and benefits of medication were discussed with the patient.  Continue Abilify 5 mg Daily  Continue Adderall 20 mg XR Daily for ADHD  Checked LA  and no irregularities were noted.  Last refill picked up on 5/2/24  Provided with 3 refills, starting on 7/3/24, 8/3/24, 9/3/24  Continue Hydroxyzine 25 mg Daily   Discussed diagnosis, risk and benefits of proposed treatment above vs alternative treatment vs no treatment, and potential side effects of these treatments, and the inherent unpredictability of individual responses to these treatments. The patient expresses understanding and gives informed consent to pursue treatment at this time, believing that the potential benefits outweigh the potential risks. Patient has no other questions. Risks/adverse effects at this time include but are not limited to: GI side effects, sexual dysfunction, activation vs sedation, triggering of suicidal ideation, and serotonin syndrome.   Patient voices understanding and agreement with this plan  Provided crisis numbers  Encouraged patient to keep future appointments  Instruct patient to call or message with questions  In the event of an emergency, including suicidal ideation, patient was advised to go to the emergency room      Return to Clinic: 1 month    Total time: 30 Minutes       This includes face to face time and non-face to face time preparing to see the patient (eg, review of tests), obtaining and/or reviewing separately obtained history, documenting clinical information in the electronic or other health record,  independently interpreting results and communicating results to the patient/family/caregiver, or care coordinator.        Emmy Rodas DNP, PMABRAHAMP, FNP

## 2024-07-31 ENCOUNTER — OFFICE VISIT (OUTPATIENT)
Dept: PSYCHOLOGY | Facility: CLINIC | Age: 24
End: 2024-07-31
Payer: COMMERCIAL

## 2024-07-31 DIAGNOSIS — G47.00 INSOMNIA, UNSPECIFIED TYPE: ICD-10-CM

## 2024-07-31 DIAGNOSIS — F31.81 BIPOLAR 2 DISORDER: Primary | ICD-10-CM

## 2024-07-31 DIAGNOSIS — F90.0 ATTENTION DEFICIT HYPERACTIVITY DISORDER (ADHD), PREDOMINANTLY INATTENTIVE TYPE: ICD-10-CM

## 2024-07-31 PROCEDURE — 90833 PSYTX W PT W E/M 30 MIN: CPT | Mod: 95,,, | Performed by: NURSE PRACTITIONER

## 2024-07-31 PROCEDURE — 99214 OFFICE O/P EST MOD 30 MIN: CPT | Mod: 95,,, | Performed by: NURSE PRACTITIONER

## 2024-07-31 NOTE — PROGRESS NOTES
The patient location is: [facility]   The chief complaint leading to consultation is: ADHD, Bipolar II, insomnia    Visit type: audiovisual    Face to Face time with patient: 20  30 minutes of total time spent on the encounter, which includes face to face time and non-face to face time preparing to see the patient (eg, review of tests), Obtaining and/or reviewing separately obtained history, Documenting clinical information in the electronic or other health record, Independently interpreting results (not separately reported) and communicating results to the patient/family/caregiver, or Care coordination (not separately reported).     Each patient to whom he or she provides medical services by telemedicine is:  (1) informed of the relationship between the physician and patient and the respective role of any other health care provider with respect to management of the patient; and (2) notified that he or she may decline to receive medical services by telemedicine and may withdraw from such care at any time.    Notes:     Outpatient Psychiatry Follow-Up Visit ([facility])    07/31/2024    Clinical Status of Patient:  Outpatient (Ambulatory)    Chief Complaint:  Giorgi Whitley is a 24 y.o. adult who presents today for follow-up of anxiety and Bipolar II, ADHD .  Met with patient.     Current Medications:   Abilify 5 mg daily  Hydroxyzine 25 mg Daily PRN  Adderall 20 mg XR    Past Medication Trials:  Adderall 30 mg Xr - too strong  Amphetamine BID- unknown    Interval History and Content of Current Session:  Patient seen and chart reviewed. Last seen on 4/29/24    Changes at last visit:  Continue Abilify 5 mg Daily  Increase Adderall 20 mg XR Daily for ADHD  Continue Hydroxyzine 25 mg Daily     Patient was 23 minutes late to the visit. Patient reports having a a panic attack 1-2 weeks ago after thinking about the future and death. He reports feeling overwhelmed. Anxiety outside this moments has been stable.       Denies SI/HI/AVH. Denies adverse effects from medication  Pt reports taking medications as prescribed and behaving appropriately during interview today.    Pt appears:  Appropriate attire    Mood:  Content    Sleep:  Reports sleeping 6-7 hrs, at times he wakes up, can fall back   easily.    Appetite:  Increased, denies weight gain.     Self Rates Depression: 0-1/10  Self Rated Anxiety:  3/10    AIMS:    0    Psychotherapy:  Target symptoms: depression, distractability, lack of focus, anxiety , mood swings, mood disorder  Why chosen therapy is appropriate versus another modality: relevant to diagnosis  Outcome monitoring methods: self-report  Therapeutic intervention type: supportive psychotherapy  Topics discussed/themes: relationships difficulties, work stress, symptom recognition  The patient's response to the intervention is accepting, motivated. The patient's progress toward treatment goals is not progressing.   Duration of intervention: 20 minutes.    Review of Systems   PSYCHIATRIC: Pertinant items are noted in the narrative.        Past Medical, Family and Social History: The patient's past medical, family and social history have been reviewed and updated as appropriate within the electronic medical record - see encounter notes.    Compliance: unable to be    Side effects: None    Risk Parameters:  Patient reports no suicidal ideation  Patient reports no homicidal ideation  Patient reports no self-injurious behavior  Patient reports no violent behavior    Exam (detailed: at least 9 elements; comprehensive: all 15 elements)   Constitutional  Vitals:  Most recent vital signs, dated less than 90 days prior to this appointment, were reviewed.   There were no vitals filed for this visit.         General:  unremarkable, age appropriate     Musculoskeletal  Muscle Strength/Tone:  no spasicity, no rigidity, no cogwheeling, no flaccidity, no paratonia, no dyskinesia, no dystonia, no tremor, no tic, no  choreoathetosis, no atrophy   Gait & Station:  non-ataxic     Psychiatric  Speech:  no latency; no press   Mood & Affect:  steady  congruent and appropriate   Thought Process:  normal and logical   Associations:  intact   Thought Content:  normal, no suicidality, no homicidality, delusions, or paranoia   Insight:  intact, has awareness of illness   Judgement: behavior is adequate to circumstances, age appropriate   Orientation:  grossly intact, person, place, situation, time/date, day of week   Memory: intact for content of interview, grossly intact, memory >3 objects at five mins   Language: grossly intact, able to name, able to repeat   Attention Span & Concentration:  able to focus   Fund of Knowledge:  intact and appropriate to age and level of education, familiar with aspects of current personal life, 4 of 4 recent presidents     Assessment and Diagnosis   Status/Progress: Based on the examination today, the patient's problem(s) is/are  not changed .  New problems have not been presented today.   Co-morbidities, Diagnostic uncertainty, and Lack of compliance are not complicating management of the primary condition.  There are no active rule-out diagnoses for this patient at this time.     General Impression: Giorgi Whitley, a 24 y.o. adult, presenting for follow up of ADHD, JOJO and Bipolar II disorder.  Presents 12/28/23- Patient has been unmediated. Start Vraylar 1.5 mg Daily   Presents 2/8/24- D/c Vraylar due to insurance, Stat Abilify 5 mg Daily  Presents 3/18/24- Mood has stabilized, Start Strattera 80 mg Daily   Presents 4/2/24- In Person Mood is stable, d/c Strattera, start Adderall 10 mg XR  Presents 4/29/24- Increase Adderall to 20 mg Daily  Presents 7/3/24- stable cont meds  Presents 7/31/24 Stable continue meds      ICD-10-CM ICD-9-CM    1. Bipolar 2 disorder  F31.81 296.89 LIPID PANEL      HEMOGLOBIN A1C      COMPREHENSIVE METABOLIC PANEL      2. Attention deficit hyperactivity disorder  (ADHD), predominantly inattentive type  F90.0 314.00       3. Insomnia, unspecified type  G47.00 780.52         Intervention/Counseling/Treatment Plan   Medication Management: The risks and benefits of medication were discussed with the patient.  Labs: CMP, Lipid Panel, Hem A1C at next visit  Continue Abilify 5 mg Daily  Continue Adderall 20 mg XR Daily for ADHD  Checked LA  and no irregularities were noted.  Last refill picked up on 7/9/24  Provided with 3 refills, starting on 8/3/24, 9/3/24, 10/3/24  Continue Hydroxyzine 25 mg Daily   Discussed diagnosis, risk and benefits of proposed treatment above vs alternative treatment vs no treatment, and potential side effects of these treatments, and the inherent unpredictability of individual responses to these treatments. The patient expresses understanding and gives informed consent to pursue treatment at this time, believing that the potential benefits outweigh the potential risks. Patient has no other questions. Risks/adverse effects at this time include but are not limited to: GI side effects, sexual dysfunction, activation vs sedation, triggering of suicidal ideation, and serotonin syndrome.   Patient voices understanding and agreement with this plan  Provided crisis numbers  Encouraged patient to keep future appointments  Instruct patient to call or message with questions  In the event of an emergency, including suicidal ideation, patient was advised to go to the emergency room      Return to Clinic: 3 months    Total time: 30 Minutes       This includes face to face time and non-face to face time preparing to see the patient (eg, review of tests), obtaining and/or reviewing separately obtained history, documenting clinical information in the electronic or other health record, independently interpreting results and communicating results to the patient/family/caregiver, or care coordinator.        Emmy Rodas DNP, PMHNP, FNP

## 2024-09-10 DIAGNOSIS — G47.00 INSOMNIA, UNSPECIFIED TYPE: ICD-10-CM

## 2024-09-11 RX ORDER — HYDROXYZINE PAMOATE 25 MG/1
25 CAPSULE ORAL NIGHTLY PRN
Qty: 90 CAPSULE | Refills: 0 | Status: SHIPPED | OUTPATIENT
Start: 2024-09-11 | End: 2024-12-10

## 2024-09-19 ENCOUNTER — PATIENT MESSAGE (OUTPATIENT)
Dept: PRIMARY CARE CLINIC | Facility: CLINIC | Age: 24
End: 2024-09-19
Payer: COMMERCIAL

## 2024-10-14 ENCOUNTER — OFFICE VISIT (OUTPATIENT)
Dept: PSYCHOLOGY | Facility: CLINIC | Age: 24
End: 2024-10-14
Payer: COMMERCIAL

## 2024-10-14 DIAGNOSIS — F31.81 BIPOLAR 2 DISORDER: ICD-10-CM

## 2024-10-14 DIAGNOSIS — F90.0 ATTENTION DEFICIT HYPERACTIVITY DISORDER (ADHD), PREDOMINANTLY INATTENTIVE TYPE: ICD-10-CM

## 2024-10-14 DIAGNOSIS — G47.00 INSOMNIA, UNSPECIFIED TYPE: Primary | ICD-10-CM

## 2024-10-14 PROCEDURE — 90833 PSYTX W PT W E/M 30 MIN: CPT | Mod: 95,,, | Performed by: NURSE PRACTITIONER

## 2024-10-14 PROCEDURE — G2211 COMPLEX E/M VISIT ADD ON: HCPCS | Mod: 95,,, | Performed by: NURSE PRACTITIONER

## 2024-10-14 PROCEDURE — 99214 OFFICE O/P EST MOD 30 MIN: CPT | Mod: 95,,, | Performed by: NURSE PRACTITIONER

## 2024-10-14 RX ORDER — DEXTROAMPHETAMINE SACCHARATE, AMPHETAMINE ASPARTATE MONOHYDRATE, DEXTROAMPHETAMINE SULFATE AND AMPHETAMINE SULFATE 5; 5; 5; 5 MG/1; MG/1; MG/1; MG/1
20 CAPSULE, EXTENDED RELEASE ORAL EVERY MORNING
Qty: 30 CAPSULE | Refills: 0 | Status: SHIPPED | OUTPATIENT
Start: 2024-12-18 | End: 2025-01-17

## 2024-10-14 RX ORDER — DEXTROAMPHETAMINE SACCHARATE, AMPHETAMINE ASPARTATE MONOHYDRATE, DEXTROAMPHETAMINE SULFATE AND AMPHETAMINE SULFATE 5; 5; 5; 5 MG/1; MG/1; MG/1; MG/1
20 CAPSULE, EXTENDED RELEASE ORAL EVERY MORNING
Qty: 30 CAPSULE | Refills: 0 | Status: SHIPPED | OUTPATIENT
Start: 2024-10-18 | End: 2024-11-17

## 2024-10-14 RX ORDER — ARIPIPRAZOLE 5 MG/1
5 TABLET ORAL DAILY
Qty: 90 TABLET | Refills: 1 | Status: SHIPPED | OUTPATIENT
Start: 2024-10-14 | End: 2025-04-12

## 2024-10-14 RX ORDER — HYDROXYZINE PAMOATE 25 MG/1
25 CAPSULE ORAL NIGHTLY PRN
Qty: 90 CAPSULE | Refills: 0 | Status: SHIPPED | OUTPATIENT
Start: 2024-10-14 | End: 2025-01-12

## 2024-10-14 RX ORDER — DEXTROAMPHETAMINE SACCHARATE, AMPHETAMINE ASPARTATE MONOHYDRATE, DEXTROAMPHETAMINE SULFATE AND AMPHETAMINE SULFATE 5; 5; 5; 5 MG/1; MG/1; MG/1; MG/1
20 CAPSULE, EXTENDED RELEASE ORAL EVERY MORNING
Qty: 30 CAPSULE | Refills: 0 | Status: SHIPPED | OUTPATIENT
Start: 2024-11-18 | End: 2024-12-18

## 2024-10-14 NOTE — PROGRESS NOTES
The patient location is: [facility]   The chief complaint leading to consultation is: ADHD, Bipolar II, insomnia    Visit type: audiovisual    Each patient to whom he or she provides medical services by telemedicine is:  (1) informed of the relationship between the physician and patient and the respective role of any other health care provider with respect to management of the patient; and (2) notified that he or she may decline to receive medical services by telemedicine and may withdraw from such care at any time.    Notes:     Outpatient Psychiatry Follow-Up Visit ([facility])    10/14/2024    Clinical Status of Patient:  Outpatient (Ambulatory)    Chief Complaint:  Giorgi Whitley is a 24 y.o. adult who presents today for follow-up of anxiety and Bipolar II, ADHD .  Met with patient.     Current Medications:   Abilify 5 mg daily  Hydroxyzine 25 mg Daily PRN  Adderall 20 mg XR    Past Medication Trials:  Adderall 30 mg Xr - too strong  Amphetamine BID- unknown    Interval History and Content of Current Session:  Patient seen and chart reviewed. Last seen on 4/29/24    Changes at last visit:  Labs: CMP, Lipid Panel, Hem A1C at next visit  Continue Abilify 5 mg Daily  Continue Adderall 20 mg XR Daily for ADHD  Checked LA  and no irregularities were noted.  Last refill picked up on 7/9/24  Provided with 3 refills, starting on 8/3/24, 9/3/24, 10/3/24  Continue Hydroxyzine 25 mg Daily      Reports that his stress level is higher than normal as he now has 2 jobs that he is balancing dur to financial necessity. Feels he is procrastinating more than before but states having 2 jobs is temporary and circumstances might change soon. He is happy with stimulant medication dose, denies irritability and anxiety. Reports mood has been stable, shannan with anxiety by going out with friends and playing pool.     Denies SI/HI/AVH. Denies adverse effects from medication  Pt reports taking medications as prescribed and  behaving appropriately during interview today.    Pt appears:  Appropriate attire    Mood:  Content    Sleep:  Reports sleeping 6-7 hrs, at times he wakes up, can fall back   easily.    Appetite:  Increased, denies weight gain.     Self Rates Depression: 0-1/10  Self Rated Anxiety:  5-6/10    AIMS:    0    Psychotherapy:  Target symptoms: depression, distractability, lack of focus, anxiety , mood swings, mood disorder  Why chosen therapy is appropriate versus another modality: relevant to diagnosis  Outcome monitoring methods: self-report  Therapeutic intervention type: supportive psychotherapy  Topics discussed/themes: relationships difficulties, work stress, symptom recognition  The patient's response to the intervention is accepting, motivated. The patient's progress toward treatment goals is not progressing.   Duration of intervention: 16 minutes.    Review of Systems   PSYCHIATRIC: Pertinant items are noted in the narrative.        Past Medical, Family and Social History: The patient's past medical, family and social history have been reviewed and updated as appropriate within the electronic medical record - see encounter notes.    Compliance: unable to be    Side effects: None    Risk Parameters:  Patient reports no suicidal ideation  Patient reports no homicidal ideation  Patient reports no self-injurious behavior  Patient reports no violent behavior    Exam (detailed: at least 9 elements; comprehensive: all 15 elements)   Constitutional  Vitals:  Most recent vital signs, dated less than 90 days prior to this appointment, were reviewed.   There were no vitals filed for this visit.         General:  unremarkable, age appropriate     Musculoskeletal  Muscle Strength/Tone:  no spasicity, no rigidity, no cogwheeling, no flaccidity, no paratonia, no dyskinesia, no dystonia, no tremor, no tic, no choreoathetosis, no atrophy   Gait & Station:  non-ataxic     Psychiatric  Speech:  no latency; no press   Mood &  Affect:  steady  congruent and appropriate   Thought Process:  normal and logical   Associations:  intact   Thought Content:  normal, no suicidality, no homicidality, delusions, or paranoia   Insight:  intact, has awareness of illness   Judgement: behavior is adequate to circumstances, age appropriate   Orientation:  grossly intact, person, place, situation, time/date, day of week   Memory: intact for content of interview, grossly intact, memory >3 objects at five mins   Language: grossly intact, able to name, able to repeat   Attention Span & Concentration:  able to focus   Fund of Knowledge:  intact and appropriate to age and level of education, familiar with aspects of current personal life, 4 of 4 recent presidents     Assessment and Diagnosis   Status/Progress: Based on the examination today, the patient's problem(s) is/are  not changed .  New problems have not been presented today.   Co-morbidities, Diagnostic uncertainty, and Lack of compliance are not complicating management of the primary condition.  There are no active rule-out diagnoses for this patient at this time.     General Impression: Giorgi Batres Gutierrez, a 24 y.o. adult, presenting for follow up of ADHD, JOJO and Bipolar II disorder.  Presents 12/28/23- Patient has been unmediated. Start Vraylar 1.5 mg Daily   Presents 2/8/24- D/c Vraylar due to insurance, Stat Abilify 5 mg Daily  Presents 3/18/24- Mood has stabilized, Start Strattera 80 mg Daily   Presents 4/2/24- In Person Mood is stable, d/c Strattera, start Adderall 10 mg XR  Presents 4/29/24- Increase Adderall to 20 mg Daily  Presents 7/3/24- stable cont meds  Presents 7/31/24 Stable continue meds  Presents 10/14/24 Stable continue meds      ICD-10-CM ICD-9-CM    1. Insomnia, unspecified type  G47.00 780.52 hydrOXYzine pamoate (VISTARIL) 25 MG Cap      2. Bipolar 2 disorder  F31.81 296.89 ARIPiprazole (ABILIFY) 5 MG Tab      3. Attention deficit hyperactivity disorder (ADHD), predominantly  inattentive type  F90.0 314.00 dextroamphetamine-amphetamine (ADDERALL XR) 20 MG 24 hr capsule      dextroamphetamine-amphetamine (ADDERALL XR) 20 MG 24 hr capsule      dextroamphetamine-amphetamine (ADDERALL XR) 20 MG 24 hr capsule          Intervention/Counseling/Treatment Plan   Medication Management: The risks and benefits of medication were discussed with the patient.  Continue Abilify 5 mg Daily  Continue Adderall 20 mg XR Daily for ADHD  Checked LA  and no irregularities were noted.  Last refill picked up on 9/18/24  Provided with 3 refills, starting on 10/18/24, 11/18/24, 12/18/24  Continue Hydroxyzine 25 mg Daily   Discussed diagnosis, risk and benefits of proposed treatment above vs alternative treatment vs no treatment, and potential side effects of these treatments, and the inherent unpredictability of individual responses to these treatments. The patient expresses understanding and gives informed consent to pursue treatment at this time, believing that the potential benefits outweigh the potential risks. Patient has no other questions. Risks/adverse effects at this time include but are not limited to: GI side effects, sexual dysfunction, activation vs sedation, triggering of suicidal ideation, and serotonin syndrome.   Patient voices understanding and agreement with this plan  Provided crisis numbers  Encouraged patient to keep future appointments  Instruct patient to call or message with questions  In the event of an emergency, including suicidal ideation, patient was advised to go to the emergency room      Return to Clinic: 3 months        Emmy Rodas DNP, PMABRAHAMP, FNP

## 2025-01-25 ENCOUNTER — TELEPHONE (OUTPATIENT)
Dept: PSYCHOLOGY | Facility: CLINIC | Age: 25
End: 2025-01-25
Payer: COMMERCIAL

## 2025-01-27 ENCOUNTER — OFFICE VISIT (OUTPATIENT)
Dept: PSYCHOLOGY | Facility: CLINIC | Age: 25
End: 2025-01-27
Payer: COMMERCIAL

## 2025-01-27 VITALS
DIASTOLIC BLOOD PRESSURE: 77 MMHG | BODY MASS INDEX: 24.68 KG/M2 | TEMPERATURE: 98 F | HEIGHT: 75 IN | RESPIRATION RATE: 18 BRPM | HEART RATE: 77 BPM | SYSTOLIC BLOOD PRESSURE: 123 MMHG

## 2025-01-27 DIAGNOSIS — G47.00 INSOMNIA, UNSPECIFIED TYPE: ICD-10-CM

## 2025-01-27 DIAGNOSIS — F31.81 BIPOLAR 2 DISORDER: ICD-10-CM

## 2025-01-27 DIAGNOSIS — F90.0 ATTENTION DEFICIT HYPERACTIVITY DISORDER (ADHD), PREDOMINANTLY INATTENTIVE TYPE: Primary | ICD-10-CM

## 2025-01-27 PROCEDURE — 3078F DIAST BP <80 MM HG: CPT | Mod: CPTII,S$GLB,, | Performed by: NURSE PRACTITIONER

## 2025-01-27 PROCEDURE — 3008F BODY MASS INDEX DOCD: CPT | Mod: CPTII,S$GLB,, | Performed by: NURSE PRACTITIONER

## 2025-01-27 PROCEDURE — 99999 PR PBB SHADOW E&M-EST. PATIENT-LVL III: CPT | Mod: PBBFAC,,, | Performed by: NURSE PRACTITIONER

## 2025-01-27 PROCEDURE — 3074F SYST BP LT 130 MM HG: CPT | Mod: CPTII,S$GLB,, | Performed by: NURSE PRACTITIONER

## 2025-01-27 PROCEDURE — G2211 COMPLEX E/M VISIT ADD ON: HCPCS | Mod: S$GLB,,, | Performed by: NURSE PRACTITIONER

## 2025-01-27 PROCEDURE — 99214 OFFICE O/P EST MOD 30 MIN: CPT | Mod: S$GLB,,, | Performed by: NURSE PRACTITIONER

## 2025-01-27 PROCEDURE — 90833 PSYTX W PT W E/M 30 MIN: CPT | Mod: S$GLB,,, | Performed by: NURSE PRACTITIONER

## 2025-01-27 RX ORDER — DEXTROAMPHETAMINE SACCHARATE, AMPHETAMINE ASPARTATE MONOHYDRATE, DEXTROAMPHETAMINE SULFATE AND AMPHETAMINE SULFATE 7.5; 7.5; 7.5; 7.5 MG/1; MG/1; MG/1; MG/1
30 CAPSULE, EXTENDED RELEASE ORAL EVERY MORNING
Qty: 30 CAPSULE | Refills: 0 | Status: SHIPPED | OUTPATIENT
Start: 2025-01-27 | End: 2025-02-26

## 2025-01-27 RX ORDER — DEXTROAMPHETAMINE SACCHARATE, AMPHETAMINE ASPARTATE MONOHYDRATE, DEXTROAMPHETAMINE SULFATE AND AMPHETAMINE SULFATE 7.5; 7.5; 7.5; 7.5 MG/1; MG/1; MG/1; MG/1
30 CAPSULE, EXTENDED RELEASE ORAL EVERY MORNING
Qty: 30 CAPSULE | Refills: 0 | Status: SHIPPED | OUTPATIENT
Start: 2025-02-27 | End: 2025-03-29

## 2025-01-27 RX ORDER — ARIPIPRAZOLE 5 MG/1
5 TABLET ORAL DAILY
Qty: 30 TABLET | Refills: 2 | Status: SHIPPED | OUTPATIENT
Start: 2025-01-27 | End: 2025-04-27

## 2025-01-27 RX ORDER — HYDROXYZINE HYDROCHLORIDE 25 MG/1
25 TABLET, FILM COATED ORAL NIGHTLY PRN
Qty: 30 TABLET | Refills: 2 | Status: SHIPPED | OUTPATIENT
Start: 2025-01-27 | End: 2025-01-28

## 2025-01-27 RX ORDER — ARIPIPRAZOLE 2 MG/1
2 TABLET ORAL DAILY
Qty: 30 TABLET | Refills: 2 | Status: SHIPPED | OUTPATIENT
Start: 2025-01-27 | End: 2025-04-27

## 2025-01-27 NOTE — PROGRESS NOTES
"Out[facility] Psychiatry Follow[facility])    01/27/2025    Clinical Status of Patient:  Outpatient (Ambulatory)    Chief Complaint:  Giorgi Whitley is a 24 y.o. adult who presents today for follow-up of anxiety and Bipolar II, ADHD .  Met with patient.     Current Medications:   Abilify 5 mg daily  Hydroxyzine 25 mg Daily PRN  Adderall 20 mg XR    Past Medication Trials:  Adderall 30 mg Xr - too strong  Amphetamine BID- unknown    Interval History and Content of Current Session:  Patient seen and chart reviewed. Last seen on 4/29/24    Changes at last visit:  Continue Abilify 5 mg Daily  Continue Adderall 20 mg XR Daily for ADHD    Reports his roommates telling him his mood has been "here or there depending on the day". He believes they mean at times he is not as full of energy as before. He reports having moments where he feels down or sad lasting a day or so, then getting back to his normal self.      Reports having difficulty with keeping his room clean and procrastinating daily. States the Adderall helps but its still an issue. Feels his anxiety increases when he does not get his chores done. Reports his roommates make him feel better about not completing everything and tell him its okay because they know he's trying. Denies irritability, increased anxiety and denies palpitations with current dose of Adderall.     Reports sleep has mainly been good, at times he gets restless but feels hydroxyzine helps. Reports he is now a  PRN, he's gone in once so far.        Denies SI/HI/AVH. Denies adverse effects from medication  Pt reports taking medications as prescribed and behaving appropriately during interview today.    Pt appears:  Appropriate attire    Mood:  Content    Sleep:  Reports sleeping 6-7 hrs, at times he wakes up, can fall back   easily.    Appetite:  Increased, denies weight gain.     Self Rates Depression: 4/10  Self Rated " "Anxiety:  5-6/10    AIMS:    0    Psychotherapy:  Target symptoms: depression, distractability, lack of focus, anxiety , mood swings, mood disorder  Why chosen therapy is appropriate versus another modality: relevant to diagnosis  Outcome monitoring methods: self-report  Therapeutic intervention type: supportive psychotherapy  Topics discussed/themes: relationships difficulties, work stress, symptom recognition  The patient's response to the intervention is accepting, motivated. The patient's progress toward treatment goals is not progressing.   Duration of intervention: 16 minutes.    Review of Systems   PSYCHIATRIC: Pertinant items are noted in the narrative.        Past Medical, Family and Social History: The patient's past medical, family and social history have been reviewed and updated as appropriate within the electronic medical record - see encounter notes.    Compliance: unable to be    Side effects: None    Risk Parameters:  Patient reports no suicidal ideation  Patient reports no homicidal ideation  Patient reports no self-injurious behavior  Patient reports no violent behavior    Exam (detailed: at least 9 elements; comprehensive: all 15 elements)   Constitutional  Vitals:  Most recent vital signs, dated less than 90 days prior to this appointment, were reviewed.   Vitals:    01/27/25 1126   BP: 123/77   Pulse: 77   Resp: 18   Temp: 98.2 °F (36.8 °C)   TempSrc: Oral   Height: 6' 3" (1.905 m)            General:  unremarkable, age appropriate     Musculoskeletal  Muscle Strength/Tone:  no spasicity, no rigidity, no cogwheeling, no flaccidity, no paratonia, no dyskinesia, no dystonia, no tremor, no tic, no choreoathetosis, no atrophy   Gait & Station:  non-ataxic     Psychiatric  Speech:  no latency; no press   Mood & Affect:  steady  congruent and appropriate   Thought Process:  normal and logical   Associations:  intact   Thought Content:  normal, no suicidality, no homicidality, delusions, or paranoia "   Insight:  intact, has awareness of illness   Judgement: behavior is adequate to circumstances, age appropriate   Orientation:  grossly intact, person, place, situation, time/date, day of week   Memory: intact for content of interview, grossly intact, memory >3 objects at five mins   Language: grossly intact, able to name, able to repeat   Attention Span & Concentration:  able to focus   Fund of Knowledge:  intact and appropriate to age and level of education, familiar with aspects of current personal life, 4 of 4 recent presidents     Assessment and Diagnosis   Status/Progress: Based on the examination today, the patient's problem(s) is/are  not changed .  New problems have not been presented today.   Co-morbidities, Diagnostic uncertainty, and Lack of compliance are not complicating management of the primary condition.  There are no active rule-out diagnoses for this patient at this time.     General Impression: Giorgi Whitley, a 24 y.o. adult, presenting for follow up of ADHD, JOJO and Bipolar II disorder.  Presents 12/28/23- Patient has been unmediated. Start Vraylar 1.5 mg Daily   Presents 2/8/24- D/c Vraylar due to insurance, Stat Abilify 5 mg Daily  Presents 3/18/24- Mood has stabilized, Start Strattera 80 mg Daily   Presents 4/2/24- In Person Mood is stable, d/c Strattera, start Adderall 10 mg XR  Presents 4/29/24- Increase Adderall to 20 mg Daily  Presents 7/3/24- stable cont meds  Presents 7/31/24 Stable continue meds  Presents 10/14/24 Stable continue meds  Presents 1/27/24- IN PERSON, Increase Abilify to 7 mg Daily, Increase Adderall to 30 mg XR Daily. Consider adding Wellbutrin at next visit.      ICD-10-CM ICD-9-CM    1. Attention deficit hyperactivity disorder (ADHD), predominantly inattentive type  F90.0 314.00 dextroamphetamine-amphetamine (ADDERALL XR) 30 MG 24 hr capsule      dextroamphetamine-amphetamine (ADDERALL XR) 30 MG 24 hr capsule      2. Bipolar 2 disorder  F31.81 296.89  ARIPiprazole (ABILIFY) 2 MG Tab      ARIPiprazole (ABILIFY) 5 MG Tab      3. Insomnia, unspecified type  G47.00 780.52 hydrOXYzine HCL (ATARAX) 25 MG tablet        Intervention/Counseling/Treatment Plan   Medication Management: The risks and benefits of medication were discussed with the patient.  Increase Abilify 7 mg Daily  Increase Adderall 30 mg XR Daily for ADHD  Checked LA  and no irregularities were noted.  Last refill picked up on 9/18/24  Provided with 2 refills, starting on 1/27/25, 2/27/25  Continue Hydroxyzine 25 mg Daily   Discussed diagnosis, risk and benefits of proposed treatment above vs alternative treatment vs no treatment, and potential side effects of these treatments, and the inherent unpredictability of individual responses to these treatments. The patient expresses understanding and gives informed consent to pursue treatment at this time, believing that the potential benefits outweigh the potential risks. Patient has no other questions. Risks/adverse effects at this time include but are not limited to: GI side effects, sexual dysfunction, activation vs sedation, triggering of suicidal ideation, and serotonin syndrome.   Patient voices understanding and agreement with this plan  Provided crisis numbers  Encouraged patient to keep future appointments  Instruct patient to call or message with questions  In the event of an emergency, including suicidal ideation, patient was advised to go to the emergency room      Return to Clinic: 6 weeks        Emmy Rodas DNP, PMABRAHAMP, FNP

## 2025-01-28 RX ORDER — HYDROXYZINE HYDROCHLORIDE 25 MG/1
25 TABLET, FILM COATED ORAL NIGHTLY PRN
Qty: 90 TABLET | Refills: 0 | Status: SHIPPED | OUTPATIENT
Start: 2025-01-28

## 2025-02-18 DIAGNOSIS — F31.81 BIPOLAR 2 DISORDER: ICD-10-CM

## 2025-02-19 RX ORDER — ARIPIPRAZOLE 2 MG/1
2 TABLET ORAL
Qty: 90 TABLET | Refills: 1 | Status: SHIPPED | OUTPATIENT
Start: 2025-02-19

## 2025-03-03 ENCOUNTER — TELEPHONE (OUTPATIENT)
Dept: PSYCHOLOGY | Facility: CLINIC | Age: 25
End: 2025-03-03
Payer: COMMERCIAL

## 2025-03-05 ENCOUNTER — OFFICE VISIT (OUTPATIENT)
Dept: PSYCHOLOGY | Facility: CLINIC | Age: 25
End: 2025-03-05
Payer: COMMERCIAL

## 2025-03-05 VITALS
DIASTOLIC BLOOD PRESSURE: 77 MMHG | SYSTOLIC BLOOD PRESSURE: 121 MMHG | TEMPERATURE: 98 F | HEART RATE: 78 BPM | OXYGEN SATURATION: 99 %

## 2025-03-05 DIAGNOSIS — F90.0 ATTENTION DEFICIT HYPERACTIVITY DISORDER (ADHD), PREDOMINANTLY INATTENTIVE TYPE: ICD-10-CM

## 2025-03-05 DIAGNOSIS — F31.81 BIPOLAR 2 DISORDER: ICD-10-CM

## 2025-03-05 PROCEDURE — 1159F MED LIST DOCD IN RCRD: CPT | Mod: CPTII,S$GLB,, | Performed by: NURSE PRACTITIONER

## 2025-03-05 PROCEDURE — 99999 PR PBB SHADOW E&M-EST. PATIENT-LVL III: CPT | Mod: PBBFAC,,, | Performed by: NURSE PRACTITIONER

## 2025-03-05 PROCEDURE — G2211 COMPLEX E/M VISIT ADD ON: HCPCS | Mod: S$GLB,,, | Performed by: NURSE PRACTITIONER

## 2025-03-05 PROCEDURE — 3078F DIAST BP <80 MM HG: CPT | Mod: CPTII,S$GLB,, | Performed by: NURSE PRACTITIONER

## 2025-03-05 PROCEDURE — 90833 PSYTX W PT W E/M 30 MIN: CPT | Mod: S$GLB,,, | Performed by: NURSE PRACTITIONER

## 2025-03-05 PROCEDURE — 99214 OFFICE O/P EST MOD 30 MIN: CPT | Mod: S$GLB,,, | Performed by: NURSE PRACTITIONER

## 2025-03-05 PROCEDURE — 3074F SYST BP LT 130 MM HG: CPT | Mod: CPTII,S$GLB,, | Performed by: NURSE PRACTITIONER

## 2025-03-05 RX ORDER — ARIPIPRAZOLE 5 MG/1
5 TABLET ORAL DAILY
Qty: 90 TABLET | Refills: 0 | Status: SHIPPED | OUTPATIENT
Start: 2025-03-05 | End: 2025-06-03

## 2025-03-05 RX ORDER — DEXTROAMPHETAMINE SACCHARATE, AMPHETAMINE ASPARTATE MONOHYDRATE, DEXTROAMPHETAMINE SULFATE AND AMPHETAMINE SULFATE 7.5; 7.5; 7.5; 7.5 MG/1; MG/1; MG/1; MG/1
30 CAPSULE, EXTENDED RELEASE ORAL EVERY MORNING
Qty: 30 CAPSULE | Refills: 0 | Status: SHIPPED | OUTPATIENT
Start: 2025-05-27 | End: 2025-06-26

## 2025-03-05 RX ORDER — DEXTROAMPHETAMINE SACCHARATE, AMPHETAMINE ASPARTATE MONOHYDRATE, DEXTROAMPHETAMINE SULFATE AND AMPHETAMINE SULFATE 7.5; 7.5; 7.5; 7.5 MG/1; MG/1; MG/1; MG/1
30 CAPSULE, EXTENDED RELEASE ORAL EVERY MORNING
Qty: 30 CAPSULE | Refills: 0 | Status: SHIPPED | OUTPATIENT
Start: 2025-04-27 | End: 2025-05-27

## 2025-03-05 RX ORDER — DEXTROAMPHETAMINE SACCHARATE, AMPHETAMINE ASPARTATE MONOHYDRATE, DEXTROAMPHETAMINE SULFATE AND AMPHETAMINE SULFATE 7.5; 7.5; 7.5; 7.5 MG/1; MG/1; MG/1; MG/1
30 CAPSULE, EXTENDED RELEASE ORAL EVERY MORNING
Qty: 30 CAPSULE | Refills: 0 | Status: SHIPPED | OUTPATIENT
Start: 2025-03-27 | End: 2025-04-26

## 2025-03-05 RX ORDER — ARIPIPRAZOLE 2 MG/1
2 TABLET ORAL DAILY
Qty: 90 TABLET | Refills: 0 | Status: SHIPPED | OUTPATIENT
Start: 2025-03-05 | End: 2025-06-03

## 2025-03-05 NOTE — PROGRESS NOTES
Outpatient Psychiatry Follow-Up Visit ([facility])    03/05/2025    Clinical Status of Patient:  Outpatient (Ambulatory)    Chief Complaint:  Giorgi Whitley is a 24 y.o. adult who presents today for follow-up of anxiety and Bipolar II, ADHD .  Met with patient.     Current Medications:   Abilify 7 mg daily  Hydroxyzine 25 mg Daily PRN  Adderall 30 mg XR    Past Medication Trials:  Adderall 30 mg Xr - too strong  Amphetamine BID- unknown    Interval History and Content of Current Session:  Patient seen and chart reviewed. Last seen on 1/27/25    Changes at last visit:  Increase Abilify 7 mg Daily  Increase Adderall 30 mg XR Daily for ADHD  Continue Hydroxyzine 25 mg Daily     Reports he's been able to do more chores, reports his roommates commenting on this. He is happy he's been more productive.Reports sleep has been inconsistent only out of poor habits. Reports intentionally gaining weight and finally hitting his goal of 200 lbs last month. Reports in past having a hard time keeping weight on. 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 Patient feels his mood has been much more stable.        Denies SI/HI/AVH. Denies adverse effects from medication  Pt reports taking medications as prescribed and behaving appropriately during interview today.    Pt appears:  Appropriate attire    Mood:  Content    Sleep:  Reports sleeping 6-7 hrs, at times he wakes up, can fall back   easily.    Appetite:  Increased    Self Rates Depression: 3/10  Self Rated Anxiety:  4/10    AIMS:    0    Psychotherapy:  Target symptoms: depression, distractability, lack of focus, anxiety , mood swings, mood disorder  Why chosen therapy is appropriate versus another modality: relevant to diagnosis  Outcome monitoring methods: self-report  Therapeutic intervention type: supportive psychotherapy  Topics discussed/themes: relationships difficulties, work stress, symptom recognition  The patient's response to the intervention is  accepting, motivated. The patient's progress toward treatment goals is not progressing.   Duration of intervention: 16 minutes.    Review of Systems   PSYCHIATRIC: Pertinant items are noted in the narrative.        Past Medical, Family and Social History: The patient's past medical, family and social history have been reviewed and updated as appropriate within the electronic medical record - see encounter notes.    Compliance: unable to be    Side effects: None    Risk Parameters:  Patient reports no suicidal ideation  Patient reports no homicidal ideation  Patient reports no self-injurious behavior  Patient reports no violent behavior    Exam (detailed: at least 9 elements; comprehensive: all 15 elements)   Constitutional  Vitals:  Most recent vital signs, dated less than 90 days prior to this appointment, were reviewed.   Vitals:    03/05/25 1129   BP: 121/77   Pulse: 78   Temp: 98.2 °F (36.8 °C)   TempSrc: Oral   SpO2: 99%            General:  unremarkable, age appropriate     Musculoskeletal  Muscle Strength/Tone:  no spasicity, no rigidity, no cogwheeling, no flaccidity, no paratonia, no dyskinesia, no dystonia, no tremor, no tic, no choreoathetosis, no atrophy   Gait & Station:  non-ataxic     Psychiatric  Speech:  no latency; no press   Mood & Affect:  steady  congruent and appropriate   Thought Process:  normal and logical   Associations:  intact   Thought Content:  normal, no suicidality, no homicidality, delusions, or paranoia   Insight:  intact, has awareness of illness   Judgement: behavior is adequate to circumstances, age appropriate   Orientation:  grossly intact, person, place, situation, time/date, day of week   Memory: intact for content of interview, grossly intact, memory >3 objects at five mins   Language: grossly intact, able to name, able to repeat   Attention Span & Concentration:  able to focus   Fund of Knowledge:  intact and appropriate to age and level of education, familiar with aspects  of current personal life, 4 of 4 recent presidents     Assessment and Diagnosis   Status/Progress: Based on the examination today, the patient's problem(s) is/are  not changed .  New problems have not been presented today.   Co-morbidities, Diagnostic uncertainty, and Lack of compliance are not complicating management of the primary condition.  There are no active rule-out diagnoses for this patient at this time.     General Impression: Giorgi Whitley, a 24 y.o. adult, presenting for follow up of ADHD, JOJO and Bipolar II disorder.  Presents 12/28/23- Patient has been unmediated. Start Vraylar 1.5 mg Daily   Presents 2/8/24- D/c Vraylar due to insurance, Stat Abilify 5 mg Daily  Presents 3/18/24- Mood has stabilized, Start Strattera 80 mg Daily   Presents 4/2/24- In Person Mood is stable, d/c Strattera, start Adderall 10 mg XR  Presents 4/29/24- Increase Adderall to 20 mg Daily  Presents 7/3/24- stable cont meds  Presents 7/31/24 Stable continue meds  Presents 10/14/24 Stable continue meds  Presents 1/27/24- IN PERSON, Increase Abilify to 7 mg Daily, Increase Adderall to 30 mg XR Daily. Consider adding Wellbutrin at next visit.  Presents 3/5/25- IN PERSON, stable continue meds    No diagnosis found.    Intervention/Counseling/Treatment Plan   Medication Management: The risks and benefits of medication were discussed with the patient.  Continue Abilify 7 mg Daily  Continue Adderall 30 mg XR Daily for ADHD  Checked LA  and no irregularities were noted.  Last refill picked up on 2/27/24  Provided with 2 refills, starting on 3/27/25, 4/27/25, 5/27/25  Continue Hydroxyzine 25 mg Daily   Discussed diagnosis, risk and benefits of proposed treatment above vs alternative treatment vs no treatment, and potential side effects of these treatments, and the inherent unpredictability of individual responses to these treatments. The patient expresses understanding and gives informed consent to pursue treatment at this  time, believing that the potential benefits outweigh the potential risks. Patient has no other questions. Risks/adverse effects at this time include but are not limited to: GI side effects, sexual dysfunction, activation vs sedation, triggering of suicidal ideation, and serotonin syndrome.   Patient voices understanding and agreement with this plan  Provided crisis numbers  Encouraged patient to keep future appointments  Instruct patient to call or message with questions  In the event of an emergency, including suicidal ideation, patient was advised to go to the emergency room      Return to Clinic: 3 months        Emmy Rodas DNP, PMABRAHAMP, FNP

## 2025-03-20 DIAGNOSIS — G47.00 INSOMNIA, UNSPECIFIED TYPE: ICD-10-CM

## 2025-03-25 RX ORDER — HYDROXYZINE PAMOATE 25 MG/1
25 CAPSULE ORAL NIGHTLY PRN
Qty: 90 CAPSULE | Refills: 0 | Status: SHIPPED | OUTPATIENT
Start: 2025-03-25 | End: 2025-06-23

## 2025-06-12 DIAGNOSIS — F31.81 BIPOLAR 2 DISORDER: ICD-10-CM

## 2025-06-12 RX ORDER — ARIPIPRAZOLE 5 MG/1
5 TABLET ORAL
Qty: 90 TABLET | Refills: 0 | Status: SHIPPED | OUTPATIENT
Start: 2025-06-12

## 2025-06-27 DIAGNOSIS — G47.00 INSOMNIA, UNSPECIFIED TYPE: ICD-10-CM

## 2025-07-01 RX ORDER — HYDROXYZINE PAMOATE 25 MG/1
25 CAPSULE ORAL NIGHTLY PRN
Qty: 90 CAPSULE | Refills: 0 | Status: SHIPPED | OUTPATIENT
Start: 2025-07-01 | End: 2025-09-29

## 2025-07-11 ENCOUNTER — TELEPHONE (OUTPATIENT)
Dept: PSYCHOLOGY | Facility: CLINIC | Age: 25
End: 2025-07-11
Payer: COMMERCIAL

## 2025-08-15 ENCOUNTER — TELEPHONE (OUTPATIENT)
Dept: PSYCHOLOGY | Facility: CLINIC | Age: 25
End: 2025-08-15
Payer: COMMERCIAL

## 2025-08-18 ENCOUNTER — OFFICE VISIT (OUTPATIENT)
Dept: PSYCHOLOGY | Facility: CLINIC | Age: 25
End: 2025-08-18
Payer: COMMERCIAL

## 2025-08-18 DIAGNOSIS — G47.00 INSOMNIA, UNSPECIFIED TYPE: ICD-10-CM

## 2025-08-18 DIAGNOSIS — F90.0 ATTENTION DEFICIT HYPERACTIVITY DISORDER (ADHD), PREDOMINANTLY INATTENTIVE TYPE: ICD-10-CM

## 2025-08-18 DIAGNOSIS — F31.81 BIPOLAR 2 DISORDER: ICD-10-CM

## 2025-08-18 PROCEDURE — G2211 COMPLEX E/M VISIT ADD ON: HCPCS | Mod: 95,,, | Performed by: NURSE PRACTITIONER

## 2025-08-18 PROCEDURE — 98006 SYNCH AUDIO-VIDEO EST MOD 30: CPT | Mod: 95,,, | Performed by: NURSE PRACTITIONER

## 2025-08-18 RX ORDER — ARIPIPRAZOLE 2 MG/1
2 TABLET ORAL DAILY
Qty: 90 TABLET | Refills: 0 | Status: SHIPPED | OUTPATIENT
Start: 2025-08-18 | End: 2025-11-16

## 2025-08-18 RX ORDER — ARIPIPRAZOLE 5 MG/1
5 TABLET ORAL DAILY
Qty: 90 TABLET | Refills: 0 | Status: SHIPPED | OUTPATIENT
Start: 2025-08-18 | End: 2025-11-16

## 2025-08-18 RX ORDER — DEXTROAMPHETAMINE SACCHARATE, AMPHETAMINE ASPARTATE MONOHYDRATE, DEXTROAMPHETAMINE SULFATE AND AMPHETAMINE SULFATE 7.5; 7.5; 7.5; 7.5 MG/1; MG/1; MG/1; MG/1
30 CAPSULE, EXTENDED RELEASE ORAL EVERY MORNING
Qty: 30 CAPSULE | Refills: 0 | Status: SHIPPED | OUTPATIENT
Start: 2025-10-18 | End: 2025-11-17

## 2025-08-18 RX ORDER — DEXTROAMPHETAMINE SACCHARATE, AMPHETAMINE ASPARTATE MONOHYDRATE, DEXTROAMPHETAMINE SULFATE AND AMPHETAMINE SULFATE 7.5; 7.5; 7.5; 7.5 MG/1; MG/1; MG/1; MG/1
30 CAPSULE, EXTENDED RELEASE ORAL EVERY MORNING
Qty: 30 CAPSULE | Refills: 0 | Status: SHIPPED | OUTPATIENT
Start: 2025-09-18 | End: 2025-10-18

## 2025-08-18 RX ORDER — HYDROXYZINE PAMOATE 25 MG/1
25 CAPSULE ORAL NIGHTLY PRN
Qty: 90 CAPSULE | Refills: 0 | Status: SHIPPED | OUTPATIENT
Start: 2025-08-18 | End: 2025-11-16

## 2025-08-18 RX ORDER — DEXTROAMPHETAMINE SACCHARATE, AMPHETAMINE ASPARTATE MONOHYDRATE, DEXTROAMPHETAMINE SULFATE AND AMPHETAMINE SULFATE 7.5; 7.5; 7.5; 7.5 MG/1; MG/1; MG/1; MG/1
30 CAPSULE, EXTENDED RELEASE ORAL EVERY MORNING
Qty: 30 CAPSULE | Refills: 0 | Status: SHIPPED | OUTPATIENT
Start: 2025-08-18 | End: 2025-09-17